# Patient Record
Sex: FEMALE | Race: ASIAN | NOT HISPANIC OR LATINO | Employment: FULL TIME | URBAN - NONMETROPOLITAN AREA
[De-identification: names, ages, dates, MRNs, and addresses within clinical notes are randomized per-mention and may not be internally consistent; named-entity substitution may affect disease eponyms.]

---

## 2021-09-13 ENCOUNTER — OFFICE VISIT (OUTPATIENT)
Dept: FAMILY MEDICINE CLINIC | Facility: CLINIC | Age: 51
End: 2021-09-13

## 2021-09-13 VITALS
SYSTOLIC BLOOD PRESSURE: 104 MMHG | HEART RATE: 79 BPM | BODY MASS INDEX: 28.16 KG/M2 | DIASTOLIC BLOOD PRESSURE: 66 MMHG | OXYGEN SATURATION: 99 % | HEIGHT: 62 IN | WEIGHT: 153 LBS | RESPIRATION RATE: 18 BRPM

## 2021-09-13 DIAGNOSIS — Z76.89 ESTABLISHING CARE WITH NEW DOCTOR, ENCOUNTER FOR: Primary | ICD-10-CM

## 2021-09-13 DIAGNOSIS — Z12.31 ENCOUNTER FOR SCREENING MAMMOGRAM FOR MALIGNANT NEOPLASM OF BREAST: ICD-10-CM

## 2021-09-13 DIAGNOSIS — J12.82 PNEUMONIA DUE TO COVID-19 VIRUS: ICD-10-CM

## 2021-09-13 DIAGNOSIS — K21.9 GASTROESOPHAGEAL REFLUX DISEASE WITHOUT ESOPHAGITIS: ICD-10-CM

## 2021-09-13 DIAGNOSIS — Z12.11 COLON CANCER SCREENING: ICD-10-CM

## 2021-09-13 DIAGNOSIS — U07.1 PNEUMONIA DUE TO COVID-19 VIRUS: ICD-10-CM

## 2021-09-13 DIAGNOSIS — E66.3 OVERWEIGHT (BMI 25.0-29.9): ICD-10-CM

## 2021-09-13 PROCEDURE — 99203 OFFICE O/P NEW LOW 30 MIN: CPT | Performed by: FAMILY MEDICINE

## 2021-09-13 RX ORDER — MELATONIN
1000 DAILY
COMMUNITY

## 2021-09-13 RX ORDER — ESOMEPRAZOLE MAGNESIUM 40 MG/1
CAPSULE, DELAYED RELEASE ORAL
COMMUNITY
Start: 2021-09-03 | End: 2022-11-28

## 2021-09-13 RX ORDER — ACETAMINOPHEN 500 MG
1000 TABLET ORAL
COMMUNITY
Start: 2021-08-28 | End: 2022-11-28

## 2021-09-13 RX ORDER — SUCRALFATE 1 G/1
1 TABLET ORAL 2 TIMES DAILY
COMMUNITY
End: 2022-03-14

## 2021-09-13 NOTE — PROGRESS NOTES
"Subjective cc: est care after COVID   Benitoveronique Jarrell is a 51 y.o. female who presents to est care after recent COVID infection.   She was treated at [facility] for COVID, went to ED via EMS - had IV infusion, DC home, stayed home bt was losing weight so she went back - bilateral PNA - admitted for 7-8 days - was having a lot of chest pain and diarrhea. A lot of GERD - started on carafate and nexium - she would like to stop carafate - GI symptoms are improving - going to finish nexium x 30 days     She is feeling much better. She started driving again on Monday. She is still getting tired very easy, she is tolerating PO, regaining weihgt     No other known medical issues   CS x 2     She did have her COVID vaccine - 8/10/21    Needs mammogram   No prior colonoscopy - no known FH of colon cancer   Would like to est with GYN but doesn't know who yet     History of Present Illness     The following portions of the patient's history were reviewed and updated as appropriate: allergies, current medications, past family history, past medical history, past social history, past surgical history and problem list.        Review of Systems   Constitutional: Positive for fatigue.   Gastrointestinal:        GERD   All other systems reviewed and are negative.      Objective   Blood pressure 104/66, pulse 79, resp. rate 18, height 157.5 cm (62\"), weight 69.4 kg (153 lb), SpO2 99 %.  Physical Exam  Vitals and nursing note reviewed.   Constitutional:       General: She is not in acute distress.     Appearance: She is well-developed. She is not diaphoretic.   HENT:      Head: Normocephalic and atraumatic.      Nose: Nose normal.   Eyes:      General:         Right eye: No discharge.         Left eye: No discharge.      Conjunctiva/sclera: Conjunctivae normal.   Neck:      Thyroid: No thyromegaly.      Trachea: No tracheal deviation.   Cardiovascular:      Rate and Rhythm: Normal rate and regular rhythm.      Heart sounds: Normal " heart sounds.   Pulmonary:      Effort: Pulmonary effort is normal. No respiratory distress.      Breath sounds: Normal breath sounds. No stridor. No wheezing.   Chest:      Chest wall: No tenderness.   Abdominal:      General: Bowel sounds are normal. There is no distension.      Palpations: Abdomen is soft.      Tenderness: There is abdominal tenderness in the epigastric area.   Musculoskeletal:         General: Normal range of motion.      Cervical back: Normal range of motion.   Lymphadenopathy:      Cervical: No cervical adenopathy.   Skin:     General: Skin is warm and dry.   Neurological:      Mental Status: She is alert and oriented to person, place, and time.      Motor: No abnormal muscle tone.      Coordination: Coordination normal.   Psychiatric:         Behavior: Behavior normal.         Thought Content: Thought content normal.         Judgment: Judgment normal.         Assessment/Plan   Problems Addressed this Visit     None      Visit Diagnoses     Establishing care with new doctor, encounter for    -  Primary    Encounter for screening mammogram for malignant neoplasm of breast        Relevant Orders    Mammo Screening Digital Tomosynthesis Bilateral With CAD    Colon cancer screening        Relevant Orders    Cologuard - Stool, Per Rectum    Pneumonia due to COVID-19 virus        Overweight (BMI 25.0-29.9)        Gastroesophageal reflux disease without esophagitis        Relevant Medications    esomeprazole (nexIUM) 40 MG capsule    sucralfate (Carafate) 1 g tablet      Diagnoses       Codes Comments    Establishing care with new doctor, encounter for    -  Primary ICD-10-CM: Z76.89  ICD-9-CM: V65.8     Encounter for screening mammogram for malignant neoplasm of breast     ICD-10-CM: Z12.31  ICD-9-CM: V76.12     Colon cancer screening     ICD-10-CM: Z12.11  ICD-9-CM: V76.51     Pneumonia due to COVID-19 virus     ICD-10-CM: U07.1, J12.82  ICD-9-CM: 480.8, 079.89     Overweight (BMI 25.0-29.9)      ICD-10-CM: E66.3  ICD-9-CM: 278.02     Gastroesophageal reflux disease without esophagitis     ICD-10-CM: K21.9  ICD-9-CM: 530.81         PLAN:     #1 COVI DPNA: new, recovered, will review DC notes     #2 overweihgt: Patient's Body mass index is 27.98 kg/m². indicating that she is overweight (BMI 25-29.9). Obesity-related health conditions include the following: none. Obesity is newly identified. BMI is is above average; BMI management plan is completed. We discussed portion control and increasing exercise..    #3 GERD: new, improving, DC carafate, complete 30 days Nexium - return I f not improving     #4 screening: will review labs, mammo ordered, will call about GYN appt, [place]           This document has been electronically signed by Christine Zhao MD on September 13, 2021 13:56 CDT

## 2021-12-16 DIAGNOSIS — R92.8 ABNORMAL MAMMOGRAM OF RIGHT BREAST: Primary | ICD-10-CM

## 2021-12-20 DIAGNOSIS — N63.0 BREAST NODULE: Primary | ICD-10-CM

## 2021-12-22 ENCOUNTER — TELEPHONE (OUTPATIENT)
Dept: FAMILY MEDICINE CLINIC | Facility: CLINIC | Age: 51
End: 2021-12-22

## 2021-12-22 NOTE — TELEPHONE ENCOUNTER
Patient advised that the results have not been signed off on at [facility] by the radiology. They will fax once they have been completed

## 2021-12-22 NOTE — TELEPHONE ENCOUNTER
Caller: Von Jarrell      Relationship: Self    Best call back number: [phone]    What is the best time to reach you:     Who are you requesting to speak with (clinical staff, provider,  specific staff member): CLINICAL STAFF    Do you know the name of the person who called: PATIENT    What was the call regarding: RESULTS OF MAMMOGRAPHY     Do you require a callback: YES

## 2022-03-14 ENCOUNTER — OFFICE VISIT (OUTPATIENT)
Dept: FAMILY MEDICINE CLINIC | Facility: CLINIC | Age: 52
End: 2022-03-14

## 2022-03-14 VITALS
OXYGEN SATURATION: 100 % | TEMPERATURE: 97.5 F | HEIGHT: 62 IN | BODY MASS INDEX: 28.89 KG/M2 | DIASTOLIC BLOOD PRESSURE: 76 MMHG | WEIGHT: 157 LBS | HEART RATE: 135 BPM | SYSTOLIC BLOOD PRESSURE: 107 MMHG

## 2022-03-14 DIAGNOSIS — H93.12 TINNITUS OF LEFT EAR: Primary | ICD-10-CM

## 2022-03-14 DIAGNOSIS — R92.8 ABNORMAL MAMMOGRAM: ICD-10-CM

## 2022-03-14 PROCEDURE — 99213 OFFICE O/P EST LOW 20 MIN: CPT | Performed by: FAMILY MEDICINE

## 2022-03-14 NOTE — PROGRESS NOTES
" Subjective   Tutut ENZO Jarrell is a 52 y.o. female.     Chief Complaint   Patient presents with   • Establish Care       Cc:  Buzzing left ear       History of Present Illness     Buzzing in left ear.  Missed appointment with cande due to covid, needs another referral.  Also  Had abnormal mammogram and follow up ultrasound.  Her provider asked that she get a second opinion regarding this from a specialist.  She asks who she should see.     She is doing well.    She had tragedy last year, her  .     Pmh:  None  Psh:  csec x 2  Sh:  No tobaco, etoh, pot  Fh:  Non contributory (family live to be quite old)    Review of Systems   Constitutional: Negative for chills, fatigue and fever.   HENT: Positive for tinnitus. Negative for congestion, ear discharge, ear pain, facial swelling, hearing loss, postnasal drip, rhinorrhea, sinus pressure, sore throat, trouble swallowing and voice change.    Eyes: Negative for discharge, redness and visual disturbance.   Respiratory: Negative for cough, chest tightness, shortness of breath and wheezing.    Cardiovascular: Negative for chest pain and palpitations.   Gastrointestinal: Negative for abdominal pain, blood in stool, constipation, diarrhea, nausea and vomiting.   Endocrine: Negative for polydipsia and polyuria.   Genitourinary: Negative for dysuria, flank pain, hematuria and urgency.   Musculoskeletal: Negative for arthralgias, back pain, joint swelling and myalgias.   Skin: Negative for rash.   Neurological: Negative for dizziness, weakness, numbness and headaches.   Hematological: Negative for adenopathy.   Psychiatric/Behavioral: Negative for confusion and sleep disturbance. The patient is not nervous/anxious.            /76 (BP Location: Left arm, Patient Position: Sitting, Cuff Size: Adult)   Pulse (!) 135   Temp 97.5 °F (36.4 °C) (Infrared)   Ht 157.5 cm (62.01\")   Wt 71.2 kg (157 lb)   SpO2 100%   BMI 28.71 kg/m²       Objective     Physical " Exam  Vitals and nursing note reviewed.   Constitutional:       Appearance: Normal appearance. She is well-developed.   HENT:      Head: Normocephalic and atraumatic.      Right Ear: External ear normal.      Left Ear: External ear normal.      Nose: Nose normal. No rhinorrhea.   Eyes:      General: No scleral icterus.     Extraocular Movements: Extraocular movements intact.      Conjunctiva/sclera: Conjunctivae normal.      Pupils: Pupils are equal, round, and reactive to light.   Cardiovascular:      Rate and Rhythm: Normal rate and regular rhythm.      Heart sounds: Normal heart sounds.     No friction rub. No gallop.   Pulmonary:      Effort: Pulmonary effort is normal.      Breath sounds: Normal breath sounds.   Abdominal:      General: Bowel sounds are normal. There is no distension.      Palpations: Abdomen is soft.      Tenderness: There is no abdominal tenderness.   Musculoskeletal:         General: No deformity. Normal range of motion.      Cervical back: Normal range of motion and neck supple.   Skin:     General: Skin is warm and dry.      Findings: No erythema or rash.   Neurological:      Mental Status: She is alert and oriented to person, place, and time.      Cranial Nerves: No cranial nerve deficit.   Psychiatric:         Behavior: Behavior normal.         Thought Content: Thought content normal.         Judgment: Judgment normal.             PAST MEDICAL HISTORY   No past medical history on file.   PAST SURGICAL HISTORY     Past Surgical History:   Procedure Laterality Date   •  SECTION      x 2       SOCIAL HISTORY     Social History     Socioeconomic History   • Marital status:    Tobacco Use   • Smoking status: Never Smoker   • Smokeless tobacco: Never Used   Substance and Sexual Activity   • Alcohol use: Never   • Drug use: Never   • Sexual activity: Defer      ALLERGIES   Patient has no known allergies.   MEDICATIONS     Current Outpatient Medications   Medication Sig Dispense  Refill   • Ascorbic Acid (VITAMIN C PO) Take  by mouth.     • cholecalciferol (VITAMIN D3) 25 MCG (1000 UT) tablet Take 1,000 Units by mouth Daily.     • esomeprazole (nexIUM) 40 MG capsule TAKE 1 CAPSULE BY MOUTH 1x Daily: 9 AM [facility]     • Zinc Sulfate (ZINC-220 PO) Take  by mouth.     • acetaminophen (TYLENOL) 500 MG tablet 1,000 mg.       No current facility-administered medications for this visit.        The following portions of the patient's history were reviewed and updated as appropriate: allergies, current medications, past family history, past medical history, past social history, past surgical history and problem list.        Assessment/Plan   Diagnoses and all orders for this visit:    1. Tinnitus of left ear (Primary)  -     Ambulatory Referral to ENT (Otolaryngology)    2. Abnormal mammogram  -     Ambulatory Referral to General Surgery      Referrals.    Any problems call.                  No follow-ups on file.                  This document has been electronically signed by Ramon Da Silva MD on March 14, 2022 12:57 CDT

## 2022-03-17 ENCOUNTER — PATIENT ROUNDING (BHMG ONLY) (OUTPATIENT)
Dept: FAMILY MEDICINE CLINIC | Facility: CLINIC | Age: 52
End: 2022-03-17

## 2022-03-17 NOTE — PROGRESS NOTES
March 17, 2022    Hello, may I speak with Von Jarrell?    My name is Radha Rascon      I am  with [facility] - [place]  [address]  [facility] [address] [phone].    Before we get started may I verify your date of birth? 1970    I am calling to officially welcome you to our practice and ask about your recent visit. Is this a good time to talk? yes    Tell me about your visit with us. What things went well?  Everything went well/ Dr Da Silva is a very good Dr.       We're always looking for ways to make our patients' experiences even better. Do you have recommendations on ways we may improve?  no    Overall were you satisfied with your first visit to our practice? yes       I appreciate you taking the time to speak with me today. Is there anything else I can do for you? no      Thank you, and have a great day.

## 2022-03-23 ENCOUNTER — OFFICE VISIT (OUTPATIENT)
Dept: SURGERY | Facility: CLINIC | Age: 52
End: 2022-03-23

## 2022-03-23 VITALS
HEIGHT: 62 IN | DIASTOLIC BLOOD PRESSURE: 72 MMHG | WEIGHT: 157 LBS | BODY MASS INDEX: 28.89 KG/M2 | SYSTOLIC BLOOD PRESSURE: 104 MMHG | OXYGEN SATURATION: 99 % | HEART RATE: 71 BPM

## 2022-03-23 DIAGNOSIS — R92.8 ABNORMAL MAMMOGRAM: Primary | ICD-10-CM

## 2022-03-23 PROCEDURE — 99203 OFFICE O/P NEW LOW 30 MIN: CPT | Performed by: SURGERY

## 2022-03-23 NOTE — PROGRESS NOTES
Chief Complaint   Patient presents with   • Abnormal Breast Imaging        HPI  52 year old with an abnormal screening mammogram. No newly palpable masses, skin dimpling, nipple discharge or axillary adenopathy.    Imaging:    Birads 3-I have personally reviewed the imaging and concur with the radiologist's findings.    History reviewed. No pertinent past medical history.    Past Surgical History:   Procedure Laterality Date   •  SECTION      x 2          Current Outpatient Medications:   •  Ascorbic Acid (VITAMIN C PO), Take  by mouth., Disp: , Rfl:   •  cholecalciferol (VITAMIN D3) 25 MCG (1000 UT) tablet, Take 1,000 Units by mouth Daily., Disp: , Rfl:   •  Zinc Sulfate (ZINC-220 PO), Take  by mouth., Disp: , Rfl:   •  acetaminophen (TYLENOL) 500 MG tablet, 1,000 mg., Disp: , Rfl:   •  esomeprazole (nexIUM) 40 MG capsule, TAKE 1 CAPSULE BY MOUTH 1x Daily: 9 AM [facility], Disp: , Rfl:     No Known Allergies    Family History   Problem Relation Age of Onset   • Heart attack Mother    • Stroke Father    • Diabetes Brother    • Cancer Daughter        Social History     Socioeconomic History   • Marital status:    Tobacco Use   • Smoking status: Never Smoker   • Smokeless tobacco: Never Used   Substance and Sexual Activity   • Alcohol use: Never   • Drug use: Never   • Sexual activity: Defer       Review of Systems   Constitutional: Negative.    HENT: Negative.    Eyes: Negative.    Respiratory: Negative.    Cardiovascular: Negative.    Gastrointestinal: Negative.    Endocrine: Negative.    Genitourinary: Negative.    Musculoskeletal: Negative.    Skin: Negative.    Allergic/Immunologic: Negative.    Neurological: Negative.    Hematological: Negative.    Psychiatric/Behavioral: Negative.        Physical Exam  Vitals reviewed.   Constitutional:       Appearance: Normal appearance.   Cardiovascular:      Rate and Rhythm: Normal rate and regular rhythm.   Chest:   Breasts: Breasts are symmetrical.       Right: No inverted nipple, mass, nipple discharge, skin change or tenderness.      Left: No inverted nipple, mass, nipple discharge, skin change or tenderness.       Musculoskeletal:      Cervical back: Normal range of motion and neck supple.   Neurological:      Mental Status: She is alert.           ASSESSMENT    Diagnoses and all orders for this visit:    1. Abnormal mammogram (Primary)        PLAN    1. Recheck in 6 months with mammogram and exam              This document has been electronically signed by Jose Sky MD on March 23, 2022 22:30 CDT

## 2022-05-02 ENCOUNTER — TELEPHONE (OUTPATIENT)
Dept: FAMILY MEDICINE CLINIC | Facility: CLINIC | Age: 52
End: 2022-05-02

## 2022-05-02 NOTE — TELEPHONE ENCOUNTER
Called regarding overdue Cologuard kit. No answer, No voicemail. Pt does not seem to have  as her PCP anymore. Called to also confirm this. Letter sent to patient.

## 2022-05-19 ENCOUNTER — OFFICE VISIT (OUTPATIENT)
Dept: FAMILY MEDICINE CLINIC | Facility: CLINIC | Age: 52
End: 2022-05-19

## 2022-05-19 DIAGNOSIS — R05.9 COUGH: ICD-10-CM

## 2022-05-19 DIAGNOSIS — J30.2 SEASONAL ALLERGIC RHINITIS, UNSPECIFIED TRIGGER: Primary | ICD-10-CM

## 2022-05-19 PROCEDURE — 99213 OFFICE O/P EST LOW 20 MIN: CPT | Performed by: FAMILY MEDICINE

## 2022-05-19 RX ORDER — PREDNISONE 10 MG/1
30 TABLET ORAL DAILY
Qty: 15 TABLET | Refills: 1 | Status: SHIPPED | OUTPATIENT
Start: 2022-05-19 | End: 2022-06-02

## 2022-05-19 RX ORDER — AZITHROMYCIN 250 MG/1
TABLET, FILM COATED ORAL
Qty: 6 TABLET | Refills: 0 | Status: SHIPPED | OUTPATIENT
Start: 2022-05-19 | End: 2022-06-02

## 2022-05-19 NOTE — PROGRESS NOTES
Subjective   Von Jarrell is a 52 y.o. female.     Chief Complaint   Patient presents with   • Allergies           Phone visit  History of Present Illness     Allergies/sinus/cough.  Took dexamethasone and helped while taking it.  She is taking claritin one daily.  She would like a zpack       Review of Systems   HENT: Positive for congestion, postnasal drip and rhinorrhea.    Respiratory: Positive for cough.            There were no vitals taken for this visit.      Objective     Physical Exam  Vitals reviewed: phone visit.             PAST MEDICAL HISTORY   No past medical history on file.   PAST SURGICAL HISTORY     Past Surgical History:   Procedure Laterality Date   •  SECTION      x 2       SOCIAL HISTORY     Social History     Socioeconomic History   • Marital status:    Tobacco Use   • Smoking status: Never Smoker   • Smokeless tobacco: Never Used   Substance and Sexual Activity   • Alcohol use: Never   • Drug use: Never   • Sexual activity: Defer      ALLERGIES   Patient has no known allergies.   MEDICATIONS     Current Outpatient Medications   Medication Sig Dispense Refill   • acetaminophen (TYLENOL) 500 MG tablet 1,000 mg.     • Ascorbic Acid (VITAMIN C PO) Take  by mouth.     • cholecalciferol (VITAMIN D3) 25 MCG (1000 UT) tablet Take 1,000 Units by mouth Daily.     • esomeprazole (nexIUM) 40 MG capsule TAKE 1 CAPSULE BY MOUTH 1x Daily: 9 AM [facility]     • Zinc Sulfate (ZINC-220 PO) Take  by mouth.     • azithromycin (Zithromax) 250 MG tablet Take 2 tablets the first day, then 1 tablet daily for 4 days. 6 tablet 0   • predniSONE (DELTASONE) 10 MG tablet Take 3 tablets by mouth Daily. 15 tablet 1     No current facility-administered medications for this visit.        The following portions of the patient's history were reviewed and updated as appropriate: allergies, current medications, past family history, past medical history, past social history, past surgical history and  problem list.        Assessment & Plan   Diagnoses and all orders for this visit:    1. Seasonal allergic rhinitis, unspecified trigger (Primary)    2. Cough    Other orders  -     azithromycin (Zithromax) 250 MG tablet; Take 2 tablets the first day, then 1 tablet daily for 4 days.  Dispense: 6 tablet; Refill: 0  -     predniSONE (DELTASONE) 10 MG tablet; Take 3 tablets by mouth Daily.  Dispense: 15 tablet; Refill: 1        Take 2 claritin daily or at bedtime if makes her drowsy    This visit has been rescheduled as a phone visit to comply with patient safety concerns in accordance with CDC recommendations. Total time of discussion was 7 minutes.                   No follow-ups on file.                  This document has been electronically signed by Ramon Da Silva MD on May 19, 2022 12:54 CDT

## 2022-06-02 ENCOUNTER — CLINICAL SUPPORT (OUTPATIENT)
Dept: AUDIOLOGY | Facility: CLINIC | Age: 52
End: 2022-06-02

## 2022-06-02 ENCOUNTER — OFFICE VISIT (OUTPATIENT)
Dept: OTOLARYNGOLOGY | Facility: CLINIC | Age: 52
End: 2022-06-02

## 2022-06-02 VITALS — BODY MASS INDEX: 29.44 KG/M2 | HEIGHT: 62 IN | TEMPERATURE: 97 F | WEIGHT: 160 LBS

## 2022-06-02 DIAGNOSIS — H90.42 SENSORINEURAL HEARING LOSS (SNHL) OF LEFT EAR WITH UNRESTRICTED HEARING OF RIGHT EAR: Primary | ICD-10-CM

## 2022-06-02 DIAGNOSIS — H93.12 TINNITUS, LEFT: Primary | ICD-10-CM

## 2022-06-02 DIAGNOSIS — H90.42 SENSORINEURAL HEARING LOSS (SNHL) OF LEFT EAR WITH UNRESTRICTED HEARING OF RIGHT EAR: ICD-10-CM

## 2022-06-02 DIAGNOSIS — H93.12 TINNITUS OF LEFT EAR: ICD-10-CM

## 2022-06-02 PROCEDURE — 99203 OFFICE O/P NEW LOW 30 MIN: CPT | Performed by: OTOLARYNGOLOGY

## 2022-06-02 PROCEDURE — 92557 COMPREHENSIVE HEARING TEST: CPT | Performed by: AUDIOLOGIST

## 2022-06-02 PROCEDURE — 92567 TYMPANOMETRY: CPT | Performed by: AUDIOLOGIST

## 2022-06-02 NOTE — PROGRESS NOTES
Subjective   Von Jarrell is a 52 y.o. female.       History of Present Illness   Patient reports that approximately 3 years ago she and her  traveled to [place] and [place], making multiple takeoffs and a sense by airline.  Reports that on descent back into [place] she experienced abrupt onset of ringing in the left ear.  This has not gone away since then and is there on a daily basis.  Is more notable in a quiet environment.  She does not feel like her hearing is significantly decreased.      The following portions of the patient's history were reviewed and updated as appropriate: allergies, current medications, past family history, past medical history, past social history, past surgical history and problem list.     reports that she has never smoked. She has never used smokeless tobacco. She reports that she does not drink alcohol and does not use drugs.   Patient is not a tobacco user and has not been counseled for use of tobacco products      Review of Systems        Objective   Physical Exam  Ears: External ears no deformity, canals no discharge, tympanic membranes intact clear and mobile bilaterally.  Nares: No discharge purulence  Oral cavity: No masses or lesions  Pharynx: No erythema or exudate  Neck: No adenopathy or mass    Audiogram is obtained and reviewed.  Hearing is within normal limits in all frequencies on the right with a type a tympanogram.  She has a high-frequency sensorineural hearing loss starting at 2000 Hz on the left.  Tympanograms are type a bilaterally.  Discrimination scores are 100% bilaterally.         Assessment and Plan   Diagnoses and all orders for this visit:    1. Tinnitus, left (Primary)    2. Sensorineural hearing loss (SNHL) of left ear with unrestricted hearing of right ear             Plan: Her history is consistent with barotrauma induced hearing loss and the tinnitus is likely a result of the hearing loss.  I explained this to her including the fact  that there is no proven medical remedy for tinnitus.  She should use masking noise as needed.  Even though her hearing is significantly asymmetric from 2000 through 8000 Hz, with an obvious etiology and 100% discrimination I do not think an MRI scan is necessary but I do want to see her again in 6 months with another hearing test to assure stability.  She is to call sooner for problems

## 2022-06-03 NOTE — PROGRESS NOTES
STANDARD AUDIOMETRIC EVALUATION      Name:  Von Jarrell  :  1970  Age:  52 y.o.  Date of Evaluation:  6/3/2022      HISTORY    Reason for visit:  Von Jarrell is seen today for a hearing test at the request of Dr. Kelvin Ly.  Patient reports she has been hearing buzzing in her left ear for the past 3 years. She states it started after she was on several airplane trips.  She states her hearing seems fine.       EVALUATION    See Audiogram    RESULTS        Otoscopy and Tympanometry 226 Hz :  Right Ear:  Otoscopy:  Testing completed after ears were examined by the ENT physician          Tympanometry:  Middle ear function within normal limits    Left Ear:   Otoscopy:  Testing completed after ears were examined by the ENT physician        Tympanometry:  Middle ear function within normal limits    Test technique:  Standard Audiometry     Pure Tone Audiometry:   Patient responded to pure tones at 5-10 dB for 250-8000 Hz in right ear, and at 5-65 dB for 250-8000 Hz in left ear.       Speech Audiometry:        Right Ear:  Speech Reception Threshold (SRT) was obtained at 0 dBHL                 Speech Discrimination scores were 100% in quiet when words were presented at 40 dBHL       Left Ear:  Speech Reception Threshold (SRT) was obtained at 10 dBHL                 Speech Discrimination scores were 100% in quiet when words were presented at 50 dBHL    Reliability:   good    IMPRESSIONS:  1.  Tympanometry results are consistent with Middle ear function within normal limits in both ears.  2.  Pure tone results are consistent with hearing sensitivity within normal limits for right ear, and within normal limits to moderate sloping sensorineural hearing loss in left ear.       RECOMMENDATIONS:  Patient is seeing the Ear Nose and Throat physician immediately following this examination.  It was a pleasure seeing Von Jarrell in Audiology today.  We would be happy to do further testing or discuss these test  as necessary.          This document has been electronically signed by Fern Morris MS CCC-ENZO on Mimi 3, 2022 12:49 CDT       Fern Morris MS CCC-A  Licensed Audiologist

## 2022-08-24 ENCOUNTER — TELEPHONE (OUTPATIENT)
Dept: FAMILY MEDICINE CLINIC | Facility: CLINIC | Age: 52
End: 2022-08-24

## 2022-08-24 NOTE — TELEPHONE ENCOUNTER
----- Message from Christine Zhao MD sent at 2022 11:51 PM CDT -----  Regarding: RE: Cologuard  Yes  ----- Message -----  From: Staci Tinoco  Sent: 2022   2:54 PM CDT  To: Christine Zhao MD  Subject: Cologuard                                        Her Cologuard order will  next month. I have been unable to reach the patient. She has a new PCP. Ok to cancel?

## 2022-11-28 ENCOUNTER — OFFICE VISIT (OUTPATIENT)
Dept: FAMILY MEDICINE CLINIC | Facility: CLINIC | Age: 52
End: 2022-11-28

## 2022-11-28 VITALS
OXYGEN SATURATION: 98 % | WEIGHT: 159 LBS | DIASTOLIC BLOOD PRESSURE: 81 MMHG | SYSTOLIC BLOOD PRESSURE: 108 MMHG | HEART RATE: 75 BPM | BODY MASS INDEX: 29.26 KG/M2 | HEIGHT: 62 IN | TEMPERATURE: 97.5 F

## 2022-11-28 DIAGNOSIS — R92.8 ABNORMAL MAMMOGRAM: ICD-10-CM

## 2022-11-28 DIAGNOSIS — R06.2 WHEEZE: Primary | ICD-10-CM

## 2022-11-28 PROCEDURE — 99213 OFFICE O/P EST LOW 20 MIN: CPT | Performed by: FAMILY MEDICINE

## 2022-11-28 RX ORDER — ALBUTEROL SULFATE 2.5 MG/3ML
2.5 SOLUTION RESPIRATORY (INHALATION) EVERY 4 HOURS PRN
Qty: 360 ML | Refills: 11 | Status: SHIPPED | OUTPATIENT
Start: 2022-11-28

## 2022-11-28 RX ORDER — ALBUTEROL SULFATE 90 UG/1
2 AEROSOL, METERED RESPIRATORY (INHALATION) EVERY 4 HOURS PRN
Qty: 18 G | Refills: 11 | Status: SHIPPED | OUTPATIENT
Start: 2022-11-28

## 2022-11-28 NOTE — PROGRESS NOTES
" Subjective   Von Jarrell is a 52 y.o. female.     Chief Complaint   Patient presents with   • Wheezing             History of Present Illness     Ever since covid, now wheezing.  Sob with activity.   No  History of asthma, non smoker.   Also  History of abnormal mammogram.  Was to have repeat mammogram and see dr nguyen in September.   Also  She was given a cologuard test, she has not done this yet.   She is staying busy.  Working at a bank.      Review of Systems   Constitutional: Negative for chills, fatigue and fever.   HENT: Negative for congestion, ear discharge, ear pain, facial swelling, hearing loss, postnasal drip, rhinorrhea, sinus pressure, sore throat, trouble swallowing and voice change.    Eyes: Negative for discharge, redness and visual disturbance.   Respiratory: Positive for wheezing. Negative for cough, chest tightness and shortness of breath.    Cardiovascular: Negative for chest pain and palpitations.   Gastrointestinal: Negative for abdominal pain, blood in stool, constipation, diarrhea, nausea and vomiting.   Endocrine: Negative for polydipsia and polyuria.   Genitourinary: Negative for dysuria, flank pain, hematuria and urgency.   Musculoskeletal: Negative for arthralgias, back pain, joint swelling and myalgias.   Skin: Negative for rash.   Neurological: Negative for dizziness, weakness, numbness and headaches.   Hematological: Negative for adenopathy.   Psychiatric/Behavioral: Negative for confusion and sleep disturbance. The patient is not nervous/anxious.            /81 (BP Location: Left arm, Patient Position: Sitting, Cuff Size: Adult)   Pulse 75   Temp 97.5 °F (36.4 °C) (Infrared)   Ht 157.5 cm (62.01\")   Wt 72.1 kg (159 lb)   SpO2 98%   BMI 29.07 kg/m²       Objective     Physical Exam  Vitals and nursing note reviewed.   Constitutional:       Appearance: Normal appearance. She is well-developed.   HENT:      Head: Normocephalic and atraumatic.      Right Ear: External ear " normal.      Left Ear: External ear normal.      Nose: Nose normal. No rhinorrhea.   Eyes:      General: No scleral icterus.     Extraocular Movements: Extraocular movements intact.      Conjunctiva/sclera: Conjunctivae normal.      Pupils: Pupils are equal, round, and reactive to light.   Cardiovascular:      Rate and Rhythm: Normal rate and regular rhythm.      Heart sounds: Normal heart sounds.     No friction rub. No gallop.   Pulmonary:      Effort: Pulmonary effort is normal.      Breath sounds: Wheezing present.   Abdominal:      General: Bowel sounds are normal. There is no distension.      Palpations: Abdomen is soft.      Tenderness: There is no abdominal tenderness.   Musculoskeletal:         General: No deformity. Normal range of motion.      Cervical back: Normal range of motion and neck supple.   Skin:     General: Skin is warm and dry.      Findings: No erythema or rash.   Neurological:      Mental Status: She is alert and oriented to person, place, and time.      Cranial Nerves: No cranial nerve deficit.   Psychiatric:         Behavior: Behavior normal.         Thought Content: Thought content normal.         Judgment: Judgment normal.             PAST MEDICAL HISTORY   No past medical history on file.   PAST SURGICAL HISTORY     Past Surgical History:   Procedure Laterality Date   •  SECTION      x 2       SOCIAL HISTORY     Social History     Socioeconomic History   • Marital status:    Tobacco Use   • Smoking status: Never   • Smokeless tobacco: Never   Substance and Sexual Activity   • Alcohol use: Never   • Drug use: Never   • Sexual activity: Defer      ALLERGIES   Patient has no known allergies.   MEDICATIONS     Current Outpatient Medications   Medication Sig Dispense Refill   • Ascorbic Acid (VITAMIN C PO) Take  by mouth.     • cholecalciferol (VITAMIN D3) 25 MCG (1000 UT) tablet Take 1,000 Units by mouth Daily.     • Zinc Sulfate (ZINC-220 PO) Take  by mouth.     • albuterol  (PROVENTIL) (2.5 MG/3ML) 0.083% nebulizer solution Take 2.5 mg by nebulization Every 4 (Four) Hours As Needed for Wheezing or Shortness of Air. 360 mL 11     No current facility-administered medications for this visit.        The following portions of the patient's history were reviewed and updated as appropriate: allergies, current medications, past family history, past medical history, past social history, past surgical history and problem list.        Assessment & Plan   Diagnoses and all orders for this visit:    1. Wheeze (Primary)  -     XR Chest PA & Lateral (In Office)  -     albuterol (PROVENTIL) (2.5 MG/3ML) 0.083% nebulizer solution; Take 2.5 mg by nebulization Every 4 (Four) Hours As Needed for Wheezing or Shortness of Air.  Dispense: 360 mL; Refill: 11    2. Abnormal mammogram  -     Mammo Diagnostic Bilateral With CAD  -     Ambulatory Referral to General Surgery        She has neb machine at home.  Use this prn wheeze.   I will call with xray is complete.     Mammogram and to see dr nguyen.     She is to perform cologuard test    She reports having had blood work last year    Her brother has diabetes, I want to make sure in future test her for diabetes.                No follow-ups on file.                  This document has been electronically signed by Ramon Da Silva MD on November 28, 2022 12:31 CST     Answers for HPI/ROS submitted by the patient on 11/28/2022  Please describe your symptoms.: Wheezing, out of breath after short activity such as house clean, walk the dog.  Have you had these symptoms before?: Yes  How long have you been having these symptoms?: Greater than 2 weeks  Please list any medications you are currently taking for this condition.: Mucinex  OT, Delsym OT  Please describe any probable cause for these symptoms. : Not sure.  What is the primary reason for your visit?: Other

## 2022-12-01 ENCOUNTER — OFFICE VISIT (OUTPATIENT)
Dept: OTOLARYNGOLOGY | Facility: CLINIC | Age: 52
End: 2022-12-01

## 2022-12-01 ENCOUNTER — CLINICAL SUPPORT (OUTPATIENT)
Dept: AUDIOLOGY | Facility: CLINIC | Age: 52
End: 2022-12-01

## 2022-12-01 VITALS — WEIGHT: 159.2 LBS | HEIGHT: 62 IN | BODY MASS INDEX: 29.3 KG/M2 | TEMPERATURE: 97 F

## 2022-12-01 DIAGNOSIS — H90.42 SENSORINEURAL HEARING LOSS (SNHL) OF LEFT EAR WITH UNRESTRICTED HEARING OF RIGHT EAR: Primary | ICD-10-CM

## 2022-12-01 DIAGNOSIS — H93.12 TINNITUS, LEFT: ICD-10-CM

## 2022-12-01 DIAGNOSIS — H93.12 TINNITUS OF LEFT EAR: ICD-10-CM

## 2022-12-01 PROCEDURE — 92567 TYMPANOMETRY: CPT | Performed by: AUDIOLOGIST

## 2022-12-01 PROCEDURE — 92557 COMPREHENSIVE HEARING TEST: CPT | Performed by: AUDIOLOGIST

## 2022-12-01 PROCEDURE — 99213 OFFICE O/P EST LOW 20 MIN: CPT | Performed by: OTOLARYNGOLOGY

## 2022-12-01 NOTE — PROGRESS NOTES
STANDARD AUDIOMETRIC EVALUATION      Name:  Von Jarrell  :  1970  Age:  52 y.o.  Date of Evaluation:  2022      HISTORY    Reason for visit:  Von Jarrell is seen today for a 6 month follow up hearing test at the request of Dr. Kelvin Ly.  Patient presented 6 months ago with a left ear high frequency sensorineural hearing loss after flying in an airplane.  Today, she reports no change in her hearing.  She states she still hears a constant buzzing sound in her left ear.      EVALUATION    See Audiogram    RESULTS        Otoscopy and Tympanometry 226 Hz :  Right Ear:  Otoscopy:  Clear ear canal          Tympanometry:  Middle ear function within normal limits    Left Ear:   Otoscopy:  Clear ear canal        Tympanometry:  Middle ear function within normal limits    Test technique:  Standard Audiometry     Pure Tone Audiometry:   Patient responded to pure tones at 5-15 dB for 250-8000 Hz in right ear, and at 10-60 dB for 250-8000 Hz in left ear.       Speech Audiometry:        Right Ear:  Speech Reception Threshold (SRT) was obtained at 5 dBHL                 Speech Discrimination scores were 96% in quiet when words were presented at 45 dBHL       Left Ear:  Speech Reception Threshold (SRT) was obtained at 5 dBHL                 Speech Discrimination scores were 96% in quiet when words were presented at 45 dBHL    Reliability:   good    IMPRESSIONS:  1.  Tympanometry results are consistent with Middle ear function within normal limits in both ears.  2.  Pure tone results are consistent with hearing sensitivity within normal limits for right ear, and within normal limits to moderate sloping sensorineural hearing loss in left ear.       RECOMMENDATIONS:  Patient is seeing the Ear Nose and Throat physician immediately following this examination.  It was a pleasure seeing Von Jarrell in Audiology today.  We would be happy to do further testing or discuss these test as necessary.          This  document has been electronically signed by Fern Morris MS CCC-A on December 1, 2022 14:47 CST       Fern Morris MS CCC-A  Licensed Audiologist

## 2022-12-02 NOTE — PROGRESS NOTES
Vaughn Jarrell is a 52 y.o. female.       History of Present Illness   Patient was seen previously with a left-sided high-frequency sensorineural hearing loss thought to be due to barotrauma following plane travel.  Has tinnitus in that ear.  Returns today for reevaluation and states that her symptoms are unchanged.  She does use headphones at work and that seems to help.  No otorrhea.      The following portions of the patient's history were reviewed and updated as appropriate: allergies, current medications, past family history, past medical history, past social history, past surgical history and problem list.     reports that she has never smoked. She has never used smokeless tobacco. She reports that she does not drink alcohol and does not use drugs.   Patient is not a tobacco user and has not been counseled for use of tobacco products      Review of Systems        Objective   Physical Exam  Ears: External ears no deformity, canals no discharge, tympanic membranes intact clear and mobile bilaterally.    Audiogram is obtained and reviewed.  This again shows normal hearing on the right in all frequencies and a sloping sensorineural hearing loss beginning at 2000 Hz on the left.  Tympanograms are type a bilaterally.  Discrimination scores are 96% bilaterally.         Assessment and Plan   Diagnoses and all orders for this visit:    1. Sensorineural hearing loss (SNHL) of left ear with unrestricted hearing of right ear (Primary)    2. Tinnitus, left             Plan: Reassured the patient that her hearing was stable.  Continue masking as needed for the tinnitus.  Return in 1 year with another hearing test call sooner for problems

## 2022-12-28 ENCOUNTER — OFFICE VISIT (OUTPATIENT)
Dept: SURGERY | Facility: CLINIC | Age: 52
End: 2022-12-28
Payer: MEDICAID

## 2022-12-28 VITALS
WEIGHT: 159.4 LBS | OXYGEN SATURATION: 98 % | HEIGHT: 62 IN | HEART RATE: 66 BPM | DIASTOLIC BLOOD PRESSURE: 74 MMHG | BODY MASS INDEX: 29.33 KG/M2 | SYSTOLIC BLOOD PRESSURE: 118 MMHG

## 2022-12-28 DIAGNOSIS — R92.8 ABNORMAL MAMMOGRAM OF RIGHT BREAST: Primary | ICD-10-CM

## 2022-12-28 PROCEDURE — 1160F RVW MEDS BY RX/DR IN RCRD: CPT | Performed by: SURGERY

## 2022-12-28 PROCEDURE — 99213 OFFICE O/P EST LOW 20 MIN: CPT | Performed by: SURGERY

## 2022-12-28 PROCEDURE — 1159F MED LIST DOCD IN RCRD: CPT | Performed by: SURGERY

## 2023-01-01 ENCOUNTER — DOCUMENTATION (OUTPATIENT)
Dept: SURGERY | Facility: CLINIC | Age: 53
End: 2023-01-01
Payer: MEDICAID

## 2023-01-01 PROBLEM — R92.8 ABNORMAL MAMMOGRAM OF RIGHT BREAST: Status: ACTIVE | Noted: 2023-01-01

## 2023-01-01 RX ORDER — OXYCODONE HYDROCHLORIDE AND ACETAMINOPHEN 5; 325 MG/1; MG/1
1 TABLET ORAL ONCE
Status: CANCELLED | OUTPATIENT
Start: 2023-01-01 | End: 2023-01-01

## 2023-01-01 RX ORDER — LIDOCAINE HYDROCHLORIDE AND EPINEPHRINE 10; 10 MG/ML; UG/ML
30 INJECTION, SOLUTION INFILTRATION; PERINEURAL ONCE
Status: CANCELLED | OUTPATIENT
Start: 2023-01-01 | End: 2023-01-01

## 2023-01-01 RX ORDER — DIAZEPAM 5 MG/1
5 TABLET ORAL ONCE
Status: CANCELLED | OUTPATIENT
Start: 2023-01-01 | End: 2023-01-01

## 2023-01-01 NOTE — H&P (VIEW-ONLY)
Chief Complaint   Patient presents with   • Follow-up     6 month follow up Bilateral Diagnostic Mammogram        HPI  52 year woman- no newly palpable masses, skin dimpling, nipple discharge, or axillary adenopathy. Imaging:    Study Result    Narrative & Impression      PROCEDURE: Bilateral diagnostic mammogram with 3-D tomosynthesis  with CAD and ultrasound right breast     REASON FOR EXAM: Abnormal screening mammography     FINDINGS: Comparison study dated 12/9/2021. Heterogeneously dense  fibroglandular tissue which decreases the sensitivity of this  exam. Right breast 9:00 position small mammographic nodule seen.  This prompted ultrasound of this lesion. On ultrasound, this  lesion is seen in the 8-9:00 position 5 cm the nipple. This  lesion corresponds to a solid hypoechoic nodule with slightly  irregular borders which measures 0.85 x 0.53 x 0.75 cm. This  lesion is suspicious and therefore warrants biopsy to further  evaluate. Ultrasound of the right breast also reveals 12:00  position 3 cm from nipple posterior hypoechoic solid nodule with  irregular borders which measures 0.88 x 0.53 x 1.44 cm. This  lesion was measured twice on ultrasound. This lesion is also  suspicious and warrants biopsy to further evaluate. Ultrasound  right breast also reveals 2:00 position 2 cm from nipple small  oval hypoechoic lesion with smooth well-defined borders and  echogenicity consistent with adjacent normal fat lobules. This  therefore most likely represents normal fat lobule in this  region. However, recommend six-month follow-up right unilateral  mammogram/ultrasound to document stability. Ultrasound of the  right breast is otherwise unremarkable. Right breast upper inner  quadrant calcifications were further characterized with spot mag  imaging. These appear dystrophic and benign. No malignant  features. However, recommend six month follow-up right unilateral  mammogram to document stability. Otherwise no interval  change in  appearance of the mammogram.     Parenchymal pattern: Heterogeneously dense fibroglandular tissue     IMPRESSION:  1.  Right breast 8-9:00 position 5 cm from nipple small mass  lesion which would be suspicious for possible malignancy.  Recommend biopsy to further evaluate.  2.  Additional right breast 12:00 position 3 cm from nipple  posterior hypoechoic solid nodule described above which would be  suspicious for possible malignancy. Recommend biopsy to further  evaluate.  3.  Ultrasound right breast also reveals 2:00 position 2 cm from  nipple small oval hypoechoic lesion with smooth well-defined  borders and echogenicity consistent with adjacent normal fat  lobules. This therefore most likely represents normal fat lobule  in this region. However, recommend six-month follow-up right  unilateral mammogram/ultrasound to document stability.   4.  Right breast upper inner quadrant calcifications were further  characterized with spot mag imaging. These appear dystrophic and  benign. No malignant features. However, recommend six month  follow-up right unilateral mammogram to document stability.      BI-RADS category 4: Suspicious abnormality     Recommendation: Ultrasound  biopsy.      Electronically signed by:  Raffi Stoll MD  2022 10:55 AM CST  Workstation: [id number]      I have personally reviewed the imaging and concur with the radiologist's findings.    No past medical history on file.    Past Surgical History:   Procedure Laterality Date   •  SECTION      x 2          Current Outpatient Medications:   •  Ascorbic Acid (VITAMIN C PO), Take  by mouth., Disp: , Rfl:   •  cholecalciferol (VITAMIN D3) 25 MCG (1000 UT) tablet, Take 1,000 Units by mouth Daily., Disp: , Rfl:   •  Zinc Sulfate (ZINC-220 PO), Take  by mouth., Disp: , Rfl:   •  albuterol (PROVENTIL) (2.5 MG/3ML) 0.083% nebulizer solution, Take 2.5 mg by nebulization Every 4 (Four) Hours As Needed for Wheezing or Shortness of Air.,  Disp: 360 mL, Rfl: 11  •  albuterol sulfate  (90 Base) MCG/ACT inhaler, Inhale 2 puffs Every 4 (Four) Hours As Needed for Wheezing. ANY ALBUTEROL INHALER IS FINE, ANY THAT HER INSURANCE COVERS. THANKS., Disp: 18 g, Rfl: 11    No Known Allergies    Family History   Problem Relation Age of Onset   • Heart attack Mother    • Stroke Father    • Diabetes Brother    • Cancer Daughter        Social History     Socioeconomic History   • Marital status:    Tobacco Use   • Smoking status: Never   • Smokeless tobacco: Never   Substance and Sexual Activity   • Alcohol use: Never   • Drug use: Never   • Sexual activity: Defer       Review of Systems   Constitutional: Negative for appetite change, chills, fever and unexpected weight change.   HENT: Negative for hearing loss, nosebleeds and trouble swallowing.    Eyes: Negative for visual disturbance.   Respiratory: Negative for apnea, cough, choking, chest tightness, shortness of breath, wheezing and stridor.    Cardiovascular: Negative for chest pain, palpitations and leg swelling.   Gastrointestinal: Negative for abdominal distention, abdominal pain, blood in stool, constipation, diarrhea, nausea and vomiting.   Endocrine: Negative for cold intolerance, heat intolerance, polydipsia, polyphagia and polyuria.   Genitourinary: Negative for difficulty urinating, dysuria, frequency, hematuria and urgency.   Musculoskeletal: Negative for arthralgias, back pain, myalgias and neck pain.   Skin: Negative for color change, pallor and rash.   Allergic/Immunologic: Negative for immunocompromised state.   Neurological: Negative for dizziness, seizures, syncope, light-headedness, numbness and headaches.   Hematological: Negative for adenopathy.   Psychiatric/Behavioral: Negative for suicidal ideas. The patient is not nervous/anxious.        Physical Exam  Chest:   Breasts:     Breasts are symmetrical.      Right: No inverted nipple, mass, nipple discharge, skin change or  tenderness.      Left: No inverted nipple, mass, nipple discharge, skin change or tenderness.           ASSESSMENT    Diagnoses and all orders for this visit:    1. Abnormal mammogram of right breast (Primary)- 2 areas        PLAN    1. US mammotome right breast 2 areas    The following were discussed with the patient/family:      What are the indications that have led your doctor to the opinion that an operation is necessary?    Breast imaging has determined an abnormal area requiring biopsy.    What, if any, alternative treatments are available for your condition?    Alternatively, open biopsy in the operating room may be performed under sedation or general anesthesia. Observation is not a good option.    What will be the likely result if you don't have the operation?    There is a possibility of missing a breast cancer diagnosis.    What are the basic procedures involved in the operation?    The patient will be placed supine for the procedure. A small incision will be made under local anesthesia, and a special, large needle will be used to perform the biopsy.   A permanent titanium clip will be placed in the breast to [place] the site of biopsy should further surgery be necessary.    What are the risks?    The risks of bleeding, infection, the possible need for open biopsy or other procedure, scarring, and poor wound healing are explained. Bruising almost always occurs. There is a low transfusion risk. The risks of chronic pain are, likewise, low.     How is the operation expected to improve your health or quality of life?    The lesion can usually be determined to be benign or malignant. Follow up xrays or further open excision may be necessary depending on the pathology.    Is hospitalization necessary and, if so, how long can you expect to be hospitalized?    This procedure is performed on an outpatient basis.    What can you expect during your recovery period?    Minimal pain is usually controlled with  non-narcotic analgesia.    When can you expect to resume normal activities?    Normal activity may be resumed the following day.    Are there likely to be residual effects from the operation?    Usually, there are no residual effects following biiopsy.    .   All questions were answered. The patient agrees to operation.              This document has been electronically signed by Jose Sky MD on January 1, 2023 14:05 CST

## 2023-01-01 NOTE — PROGRESS NOTES
Chief Complaint   Patient presents with   • Follow-up     6 month follow up Bilateral Diagnostic Mammogram        HPI  52 year woman- no newly palpable masses, skin dimpling, nipple discharge, or axillary adenopathy. Imaging:    Study Result    Narrative & Impression      PROCEDURE: Bilateral diagnostic mammogram with 3-D tomosynthesis  with CAD and ultrasound right breast     REASON FOR EXAM: Abnormal screening mammography     FINDINGS: Comparison study dated 12/9/2021. Heterogeneously dense  fibroglandular tissue which decreases the sensitivity of this  exam. Right breast 9:00 position small mammographic nodule seen.  This prompted ultrasound of this lesion. On ultrasound, this  lesion is seen in the 8-9:00 position 5 cm the nipple. This  lesion corresponds to a solid hypoechoic nodule with slightly  irregular borders which measures 0.85 x 0.53 x 0.75 cm. This  lesion is suspicious and therefore warrants biopsy to further  evaluate. Ultrasound of the right breast also reveals 12:00  position 3 cm from nipple posterior hypoechoic solid nodule with  irregular borders which measures 0.88 x 0.53 x 1.44 cm. This  lesion was measured twice on ultrasound. This lesion is also  suspicious and warrants biopsy to further evaluate. Ultrasound  right breast also reveals 2:00 position 2 cm from nipple small  oval hypoechoic lesion with smooth well-defined borders and  echogenicity consistent with adjacent normal fat lobules. This  therefore most likely represents normal fat lobule in this  region. However, recommend six-month follow-up right unilateral  mammogram/ultrasound to document stability. Ultrasound of the  right breast is otherwise unremarkable. Right breast upper inner  quadrant calcifications were further characterized with spot mag  imaging. These appear dystrophic and benign. No malignant  features. However, recommend six month follow-up right unilateral  mammogram to document stability. Otherwise no interval  change in  appearance of the mammogram.     Parenchymal pattern: Heterogeneously dense fibroglandular tissue     IMPRESSION:  1.  Right breast 8-9:00 position 5 cm from nipple small mass  lesion which would be suspicious for possible malignancy.  Recommend biopsy to further evaluate.  2.  Additional right breast 12:00 position 3 cm from nipple  posterior hypoechoic solid nodule described above which would be  suspicious for possible malignancy. Recommend biopsy to further  evaluate.  3.  Ultrasound right breast also reveals 2:00 position 2 cm from  nipple small oval hypoechoic lesion with smooth well-defined  borders and echogenicity consistent with adjacent normal fat  lobules. This therefore most likely represents normal fat lobule  in this region. However, recommend six-month follow-up right  unilateral mammogram/ultrasound to document stability.   4.  Right breast upper inner quadrant calcifications were further  characterized with spot mag imaging. These appear dystrophic and  benign. No malignant features. However, recommend six month  follow-up right unilateral mammogram to document stability.      BI-RADS category 4: Suspicious abnormality     Recommendation: Ultrasound  biopsy.      Electronically signed by:  Raffi Stoll MD  2022 10:55 AM CST  Workstation: [id number]      I have personally reviewed the imaging and concur with the radiologist's findings.    No past medical history on file.    Past Surgical History:   Procedure Laterality Date   •  SECTION      x 2          Current Outpatient Medications:   •  Ascorbic Acid (VITAMIN C PO), Take  by mouth., Disp: , Rfl:   •  cholecalciferol (VITAMIN D3) 25 MCG (1000 UT) tablet, Take 1,000 Units by mouth Daily., Disp: , Rfl:   •  Zinc Sulfate (ZINC-220 PO), Take  by mouth., Disp: , Rfl:   •  albuterol (PROVENTIL) (2.5 MG/3ML) 0.083% nebulizer solution, Take 2.5 mg by nebulization Every 4 (Four) Hours As Needed for Wheezing or Shortness of Air.,  Disp: 360 mL, Rfl: 11  •  albuterol sulfate  (90 Base) MCG/ACT inhaler, Inhale 2 puffs Every 4 (Four) Hours As Needed for Wheezing. ANY ALBUTEROL INHALER IS FINE, ANY THAT HER INSURANCE COVERS. THANKS., Disp: 18 g, Rfl: 11    No Known Allergies    Family History   Problem Relation Age of Onset   • Heart attack Mother    • Stroke Father    • Diabetes Brother    • Cancer Daughter        Social History     Socioeconomic History   • Marital status:    Tobacco Use   • Smoking status: Never   • Smokeless tobacco: Never   Substance and Sexual Activity   • Alcohol use: Never   • Drug use: Never   • Sexual activity: Defer       Review of Systems   Constitutional: Negative for appetite change, chills, fever and unexpected weight change.   HENT: Negative for hearing loss, nosebleeds and trouble swallowing.    Eyes: Negative for visual disturbance.   Respiratory: Negative for apnea, cough, choking, chest tightness, shortness of breath, wheezing and stridor.    Cardiovascular: Negative for chest pain, palpitations and leg swelling.   Gastrointestinal: Negative for abdominal distention, abdominal pain, blood in stool, constipation, diarrhea, nausea and vomiting.   Endocrine: Negative for cold intolerance, heat intolerance, polydipsia, polyphagia and polyuria.   Genitourinary: Negative for difficulty urinating, dysuria, frequency, hematuria and urgency.   Musculoskeletal: Negative for arthralgias, back pain, myalgias and neck pain.   Skin: Negative for color change, pallor and rash.   Allergic/Immunologic: Negative for immunocompromised state.   Neurological: Negative for dizziness, seizures, syncope, light-headedness, numbness and headaches.   Hematological: Negative for adenopathy.   Psychiatric/Behavioral: Negative for suicidal ideas. The patient is not nervous/anxious.        Physical Exam  Chest:   Breasts:     Breasts are symmetrical.      Right: No inverted nipple, mass, nipple discharge, skin change or  tenderness.      Left: No inverted nipple, mass, nipple discharge, skin change or tenderness.           ASSESSMENT    Diagnoses and all orders for this visit:    1. Abnormal mammogram of right breast (Primary)- 2 areas        PLAN    1. US mammotome right breast 2 areas    The following were discussed with the patient/family:      What are the indications that have led your doctor to the opinion that an operation is necessary?    Breast imaging has determined an abnormal area requiring biopsy.    What, if any, alternative treatments are available for your condition?    Alternatively, open biopsy in the operating room may be performed under sedation or general anesthesia. Observation is not a good option.    What will be the likely result if you don't have the operation?    There is a possibility of missing a breast cancer diagnosis.    What are the basic procedures involved in the operation?    The patient will be placed supine for the procedure. A small incision will be made under local anesthesia, and a special, large needle will be used to perform the biopsy.   A permanent titanium clip will be placed in the breast to [place] the site of biopsy should further surgery be necessary.    What are the risks?    The risks of bleeding, infection, the possible need for open biopsy or other procedure, scarring, and poor wound healing are explained. Bruising almost always occurs. There is a low transfusion risk. The risks of chronic pain are, likewise, low.     How is the operation expected to improve your health or quality of life?    The lesion can usually be determined to be benign or malignant. Follow up xrays or further open excision may be necessary depending on the pathology.    Is hospitalization necessary and, if so, how long can you expect to be hospitalized?    This procedure is performed on an outpatient basis.    What can you expect during your recovery period?    Minimal pain is usually controlled with  non-narcotic analgesia.    When can you expect to resume normal activities?    Normal activity may be resumed the following day.    Are there likely to be residual effects from the operation?    Usually, there are no residual effects following biiopsy.    .   All questions were answered. The patient agrees to operation.              This document has been electronically signed by Jose Sky MD on January 1, 2023 14:05 CST

## 2023-01-04 ENCOUNTER — HOSPITAL ENCOUNTER (OUTPATIENT)
Dept: ULTRASOUND IMAGING | Facility: HOSPITAL | Age: 53
Discharge: HOME | End: 2023-01-04
Payer: COMMERCIAL

## 2023-01-04 VITALS
HEIGHT: 62 IN | WEIGHT: 158.07 LBS | DIASTOLIC BLOOD PRESSURE: 79 MMHG | SYSTOLIC BLOOD PRESSURE: 116 MMHG | BODY MASS INDEX: 29.09 KG/M2 | HEART RATE: 88 BPM | OXYGEN SATURATION: 98 % | RESPIRATION RATE: 18 BRPM | TEMPERATURE: 97.6 F

## 2023-01-04 DIAGNOSIS — R92.8 ABNORMAL MAMMOGRAM OF RIGHT BREAST: ICD-10-CM

## 2023-01-04 PROCEDURE — A4648 IMPLANTABLE TISSUE MARKER: HCPCS

## 2023-01-04 PROCEDURE — 19083 BX BREAST 1ST LESION US IMAG: CPT | Performed by: SURGERY

## 2023-01-04 PROCEDURE — 19084 BX BREAST ADD LESION US IMAG: CPT | Performed by: SURGERY

## 2023-01-04 PROCEDURE — 88305 TISSUE EXAM BY PATHOLOGIST: CPT

## 2023-01-04 RX ORDER — LIDOCAINE HYDROCHLORIDE AND EPINEPHRINE 10; 10 MG/ML; UG/ML
30 INJECTION, SOLUTION INFILTRATION; PERINEURAL ONCE
Status: COMPLETED | OUTPATIENT
Start: 2023-01-04 | End: 2023-01-04

## 2023-01-04 RX ORDER — DIAZEPAM 5 MG/1
5 TABLET ORAL ONCE
Status: DISCONTINUED | OUTPATIENT
Start: 2023-01-04 | End: 2023-01-05 | Stop reason: HOSPADM

## 2023-01-04 RX ORDER — OXYCODONE HYDROCHLORIDE AND ACETAMINOPHEN 5; 325 MG/1; MG/1
1 TABLET ORAL ONCE
Status: DISCONTINUED | OUTPATIENT
Start: 2023-01-04 | End: 2023-01-05 | Stop reason: HOSPADM

## 2023-01-04 RX ADMIN — LIDOCAINE HYDROCHLORIDE AND EPINEPHRINE 35 ML: 10; 10 INJECTION, SOLUTION INFILTRATION; PERINEURAL at 09:06

## 2023-01-04 NOTE — INTERVAL H&P NOTE
H&P reviewed. The patient was examined and there are no changes to the H&P.      Temp:  [97.9 °F (36.6 °C)] 97.9 °F (36.6 °C)  Heart Rate:  [72] 72  Resp:  [18] 18  BP: (116)/(82) 116/82    The report of the mammogram demonstrates 2 areas of abnormality that are confirmed with ultrasound.  We will proceed with ultrasound biopsy of both areas under mammotome guidance with clip placement.    The following were discussed with the patient/family:      What are the indications that have led your doctor to the opinion that an operation is necessary?    Breast imaging has determined an abnormal area requiring biopsy.    What, if any, alternative treatments are available for your condition?    Alternatively, open biopsy in the operating room may be performed under sedation or general anesthesia. Observation is not a good option.    What will be the likely result if you don't have the operation?    There is a possibility of missing a breast cancer diagnosis.    What are the basic procedures involved in the operation?    The patient will be placed supine for the procedure. A small incision will be made under local anesthesia, and a special, large needle will be used to perform the biopsy.   A permanent titanium clip will be placed in the breast to [place] the site of biopsy should further surgery be necessary.    What are the risks?    The risks of bleeding, infection, the possible need for open biopsy or other procedure, scarring, and poor wound healing are explained. Bruising almost always occurs. There is a low transfusion risk. The risks of chronic pain are, likewise, low.     How is the operation expected to improve your health or quality of life?    The lesion can usually be determined to be benign or malignant. Follow up xrays or further open excision may be necessary depending on the pathology.    Is hospitalization necessary and, if so, how long can you expect to be hospitalized?    This procedure is performed on an  outpatient basis.    What can you expect during your recovery period?    Minimal pain is usually controlled with non-narcotic analgesia.    When can you expect to resume normal activities?    Normal activity may be resumed the following day.    Are there likely to be residual effects from the operation?    Usually, there are no residual effects following biiopsy.    .   All questions were answered. The patient agrees to operation.

## 2023-01-05 LAB — REF LAB TEST METHOD: NORMAL

## 2023-01-10 ENCOUNTER — OFFICE VISIT (OUTPATIENT)
Dept: SURGERY | Facility: CLINIC | Age: 53
End: 2023-01-10
Payer: COMMERCIAL

## 2023-01-10 VITALS
HEIGHT: 62 IN | OXYGEN SATURATION: 100 % | TEMPERATURE: 97.2 F | DIASTOLIC BLOOD PRESSURE: 70 MMHG | SYSTOLIC BLOOD PRESSURE: 110 MMHG | WEIGHT: 159 LBS | BODY MASS INDEX: 29.26 KG/M2 | HEART RATE: 66 BPM

## 2023-01-10 DIAGNOSIS — R92.8 ABNORMAL MAMMOGRAM OF RIGHT BREAST: Primary | ICD-10-CM

## 2023-01-10 PROCEDURE — 99212 OFFICE O/P EST SF 10 MIN: CPT | Performed by: SURGERY

## 2023-01-11 NOTE — PROGRESS NOTES
Chief Complaint   Patient presents with   • Follow-up     Mammotome results        HPI  52-year-old woman status post ultrasound mammotome demonstrating the following:    DIAGNOSIS:  A. BREAST, BIOPSY, NEEDLE CORES, RIGHT 8-9:00:  Fibroadenoma.  Negative for atypia and malignancy.  B. BREAST, BIOPSY, NEEDLE CORES, RIGHT 12:00:  Fibroadenoma, adenosis, and luminal calcifications.  Negative for atypia and malignancy.    No past medical history on file.    Past Surgical History:   Procedure Laterality Date   •  SECTION      x 2          Current Outpatient Medications:   •  albuterol (PROVENTIL) (2.5 MG/3ML) 0.083% nebulizer solution, Take 2.5 mg by nebulization Every 4 (Four) Hours As Needed for Wheezing or Shortness of Air., Disp: 360 mL, Rfl: 11  •  albuterol sulfate  (90 Base) MCG/ACT inhaler, Inhale 2 puffs Every 4 (Four) Hours As Needed for Wheezing. ANY ALBUTEROL INHALER IS FINE, ANY THAT HER INSURANCE COVERS. THANKS., Disp: 18 g, Rfl: 11  •  Ascorbic Acid (VITAMIN C PO), Take  by mouth., Disp: , Rfl:   •  cholecalciferol (VITAMIN D3) 25 MCG (1000 UT) tablet, Take 1,000 Units by mouth Daily., Disp: , Rfl:   •  Zinc Sulfate (ZINC-220 PO), Take  by mouth., Disp: , Rfl:     No Known Allergies    Family History   Problem Relation Age of Onset   • Heart attack Mother    • Stroke Father    • Diabetes Brother    • Cancer Daughter        Social History     Socioeconomic History   • Marital status:    Tobacco Use   • Smoking status: Never   • Smokeless tobacco: Never   Vaping Use   • Vaping Use: Never used   Substance and Sexual Activity   • Alcohol use: Never   • Drug use: Never   • Sexual activity: Defer           Physical Exam  Incisions are nicely healing without evidence of infection, drainage or significant hematoma    ASSESSMENT    Diagnoses and all orders for this visit:    1. Abnormal mammogram of right breast (Primary)  -     Mammo diagnostic digital tomosynthesis right w CAD; Future  -      US Breast Right Limited; Future        PLAN    1.  Recheck in 6 weeks with mammogram and ultrasound on right side              This document has been electronically signed by Jose Sky MD on January 11, 2023 15:59 CST

## 2023-04-25 ENCOUNTER — OFFICE VISIT (OUTPATIENT)
Dept: FAMILY MEDICINE CLINIC | Facility: CLINIC | Age: 53
End: 2023-04-25
Payer: COMMERCIAL

## 2023-04-25 DIAGNOSIS — J30.2 SEASONAL ALLERGIC RHINITIS, UNSPECIFIED TRIGGER: ICD-10-CM

## 2023-04-25 DIAGNOSIS — R06.2 WHEEZING: Primary | ICD-10-CM

## 2023-04-25 RX ORDER — PREDNISONE 20 MG/1
TABLET ORAL
Qty: 10 TABLET | Refills: 2 | Status: SHIPPED | OUTPATIENT
Start: 2023-04-25

## 2023-04-25 RX ORDER — ALBUTEROL SULFATE 90 UG/1
2 AEROSOL, METERED RESPIRATORY (INHALATION) EVERY 4 HOURS PRN
Qty: 18 G | Refills: 11 | Status: SHIPPED | OUTPATIENT
Start: 2023-04-25

## 2023-04-25 RX ORDER — LORATADINE 10 MG/1
10 TABLET ORAL DAILY
Qty: 30 TABLET | Refills: 11 | Status: SHIPPED | OUTPATIENT
Start: 2023-04-25

## 2023-04-25 NOTE — PROGRESS NOTES
Subjective   Tutut ENZO Jarrell is a 53 y.o. female.     Chief Complaint   Patient presents with   • Med Refill           PHONE VISIT    History of Present Illness     HAS not had to use inhaler much until yesterday.  Now is wheezing all of the time.  Working at the [employer].  Doesn't think she needs time off or has an infection.  Never diagnosed with asthma but will have episodes of wheezing ever since having covid.   She is taking mucinex dm and it is helping her cough    Review of Systems   Respiratory: Positive for cough and wheezing.            There were no vitals taken for this visit.      Objective     Physical Exam  Vitals reviewed: phone visit.             PAST MEDICAL HISTORY   No past medical history on file.   PAST SURGICAL HISTORY     Past Surgical History:   Procedure Laterality Date   •  SECTION      x 2       SOCIAL HISTORY     Social History     Socioeconomic History   • Marital status:    Tobacco Use   • Smoking status: Never     Passive exposure: Never   • Smokeless tobacco: Never   Vaping Use   • Vaping Use: Never used   Substance and Sexual Activity   • Alcohol use: Never   • Drug use: Never   • Sexual activity: Defer      ALLERGIES   Patient has no known allergies.   MEDICATIONS     Current Outpatient Medications   Medication Sig Dispense Refill   • albuterol (PROVENTIL) (2.5 MG/3ML) 0.083% nebulizer solution Take 2.5 mg by nebulization Every 4 (Four) Hours As Needed for Wheezing or Shortness of Air. 360 mL 11   • Ascorbic Acid (VITAMIN C PO) Take  by mouth.     • cholecalciferol (VITAMIN D3) 25 MCG (1000 UT) tablet Take 1 tablet by mouth Daily.     • Zinc Sulfate (ZINC-220 PO) Take  by mouth.     • albuterol sulfate  (90 Base) MCG/ACT inhaler Inhale 2 puffs Every 4 (Four) Hours As Needed for Wheezing. ANY ALBUTEROL INHALER IS FINE, ANY THAT HER INSURANCE COVERS. THANKS. 18 g 11   • loratadine (Claritin) 10 MG tablet Take 1 tablet by mouth Daily. 30 tablet 11   • predniSONE  (DELTASONE) 20 MG tablet 40MG QAM FOR 5 DAYS.  REFILLS ARE FOR YOU TO TREAT YOURSELF TO AVOID EXACERBATIONS. 10 tablet 2     No current facility-administered medications for this visit.        The following portions of the patient's history were reviewed and updated as appropriate: allergies, current medications, past family history, past medical history, past social history, past surgical history and problem list.        Assessment & Plan   Diagnoses and all orders for this visit:    1. Wheezing (Primary)    2. Seasonal allergic rhinitis, unspecified trigger    Other orders  -     albuterol sulfate  (90 Base) MCG/ACT inhaler; Inhale 2 puffs Every 4 (Four) Hours As Needed for Wheezing. ANY ALBUTEROL INHALER IS FINE, ANY THAT HER INSURANCE COVERS. THANKS.  Dispense: 18 g; Refill: 11  -     loratadine (Claritin) 10 MG tablet; Take 1 tablet by mouth Daily.  Dispense: 30 tablet; Refill: 11  -     predniSONE (DELTASONE) 20 MG tablet; 40MG QAM FOR 5 DAYS.  REFILLS ARE FOR YOU TO TREAT YOURSELF TO AVOID EXACERBATIONS.  Dispense: 10 tablet; Refill: 2      If continues, will send to pulmonologist.  I suspect asthma.      Went over meds.    Recommended she take claritin daily for 30 days at least.       This visit has been rescheduled as a phone visit to comply with patient safety concerns in accordance with CDC recommendations. Total time of discussion was 7 minutes.                   No follow-ups on file.                  This document has been electronically signed by Ramon Da Silva MD on April 25, 2023 16:47 CDT

## 2023-05-03 ENCOUNTER — TELEPHONE (OUTPATIENT)
Dept: FAMILY MEDICINE CLINIC | Facility: CLINIC | Age: 53
End: 2023-05-03
Payer: COMMERCIAL

## 2023-05-03 DIAGNOSIS — J40 BRONCHITIS: ICD-10-CM

## 2023-05-03 DIAGNOSIS — R05.1 ACUTE COUGH: Primary | ICD-10-CM

## 2023-05-03 RX ORDER — AZITHROMYCIN 250 MG/1
TABLET, FILM COATED ORAL
Qty: 6 TABLET | Refills: 0 | Status: SHIPPED | OUTPATIENT
Start: 2023-05-03

## 2023-05-03 RX ORDER — BENZONATATE 100 MG/1
100 CAPSULE ORAL 3 TIMES DAILY PRN
Qty: 60 CAPSULE | Refills: 0 | Status: SHIPPED | OUTPATIENT
Start: 2023-05-03

## 2023-05-03 NOTE — TELEPHONE ENCOUNTER
PATIENT HAD A PHONE VISIT LAST WEEK WITH DR. BONE.  SHE SAID HE OFFERED TO SEND IN ANTIBIOTICS AND SOMETHING FOR HER COUGH.  SHE DECLINED, BUT CALLED TODAY ASKING IF THOSE COULD BE SENT TO [facility] [place].

## 2023-05-24 ENCOUNTER — TELEPHONE (OUTPATIENT)
Dept: FAMILY MEDICINE CLINIC | Facility: CLINIC | Age: 53
End: 2023-05-24
Payer: COMMERCIAL

## 2023-05-24 DIAGNOSIS — R06.2 WHEEZING: Primary | ICD-10-CM

## 2023-05-24 NOTE — TELEPHONE ENCOUNTER
MsDion Chrystal called and wanted to know if you could send a referral to Pulmonologist, she said she is having a lot of mucus, and that you had prescribed her an inhaler?

## 2023-05-25 RX ORDER — BUDESONIDE AND FORMOTEROL FUMARATE DIHYDRATE 80; 4.5 UG/1; UG/1
2 AEROSOL RESPIRATORY (INHALATION)
Qty: 10.2 G | Refills: 12 | Status: SHIPPED | OUTPATIENT
Start: 2023-05-25

## 2023-05-25 NOTE — TELEPHONE ENCOUNTER
Patient aware referral put in.  Mrs. Jarrell also wanted to know if you thought a maintenance inhaler would help her?

## 2023-05-26 DIAGNOSIS — J45.909 ASTHMA, UNSPECIFIED ASTHMA SEVERITY, UNSPECIFIED WHETHER COMPLICATED, UNSPECIFIED WHETHER PERSISTENT: Primary | ICD-10-CM

## 2023-06-05 ENCOUNTER — TELEPHONE (OUTPATIENT)
Dept: FAMILY MEDICINE CLINIC | Facility: CLINIC | Age: 53
End: 2023-06-05
Payer: COMMERCIAL

## 2023-06-05 NOTE — TELEPHONE ENCOUNTER
LIANE--PATIENT WANTED YOU TO KNOW THAT SYMBICORT IS WORKING WELL FOR HER AND SHE SEES A PULMONOLOGIST ON [address].

## 2023-06-15 ENCOUNTER — PROCEDURE VISIT (OUTPATIENT)
Dept: PULMONOLOGY | Facility: CLINIC | Age: 53
End: 2023-06-15
Payer: COMMERCIAL

## 2023-06-15 ENCOUNTER — OFFICE VISIT (OUTPATIENT)
Dept: PULMONOLOGY | Facility: CLINIC | Age: 53
End: 2023-06-15
Payer: COMMERCIAL

## 2023-06-15 VITALS
HEART RATE: 68 BPM | DIASTOLIC BLOOD PRESSURE: 68 MMHG | SYSTOLIC BLOOD PRESSURE: 100 MMHG | OXYGEN SATURATION: 100 % | WEIGHT: 155 LBS | HEIGHT: 62 IN | BODY MASS INDEX: 28.52 KG/M2

## 2023-06-15 DIAGNOSIS — J45.20 MILD INTERMITTENT ASTHMA WITHOUT COMPLICATION: ICD-10-CM

## 2023-06-15 DIAGNOSIS — J45.909 ASTHMA, UNSPECIFIED ASTHMA SEVERITY, UNSPECIFIED WHETHER COMPLICATED, UNSPECIFIED WHETHER PERSISTENT: ICD-10-CM

## 2023-06-15 DIAGNOSIS — R06.2 WHEEZING: Primary | ICD-10-CM

## 2023-06-15 RX ORDER — MONTELUKAST SODIUM 10 MG/1
10 TABLET ORAL NIGHTLY
Qty: 30 TABLET | Refills: 11 | Status: SHIPPED | OUTPATIENT
Start: 2023-06-15

## 2023-06-15 NOTE — PROGRESS NOTES
BASIC SPIROMETRY ONLY.     GOOD PATIENT EFFORT AND COOPERATION.     6 SECOND EXHALATION ON SPIROMETRY.    ORDERED BY LUI HODGES; READ BY DR. REBOLLAR

## 2023-06-15 NOTE — PROGRESS NOTES
Pulmonary Office Visit    Ramon Da Silva MD    Thank you for asking me to see Von Jarrell for   Chief Complaint   Patient presents with    Wheezing       History of Present Illness  Ms. Jarrell is a 52 year old patient referred from PCP for intermittent wheezing. She has had an albuterol inhaler since 11/2022 that she has been using daily since.   Recommended Claritin. She was given symbicort a few weeks ago and this has been working well for her. She doesn't have history of asthma that she knows of. She says she had wheezed off and on since Covid in 2021. She was hospitalized for covid and had the infusion.   Chart reviews show bi-annual treatments for allergic rhinitis likely related to season changes.     She has a neb machine at home, she has used in the AM in the past.   She no longer coughs in the day or at night.     No family hx of asthma, copd, or lung cancer.       Triggers/Pets/Occupation: Lived in [facility], [facility], [place], [facility], Lived here for 26 years. No pets in the house. No fans, no carpet. No identifiable triggers.   Prior medication use: albuterol, symbicort.   Rescue inhaler use: none now.   ER visits/hospitalization/exacerbations at PCP/[facility]: none    Tobacco use history:  Social History     Socioeconomic History    Marital status:    Tobacco Use    Smoking status: Never     Passive exposure: Never    Smokeless tobacco: Never   Vaping Use    Vaping Use: Never used   Substance and Sexual Activity    Alcohol use: Never    Drug use: Never    Sexual activity: Defer         Review of Systems: History obtained from chart review and the patient.  Review of Systems    As described in the HPI. Otherwise, remainder of ROS (14 systems) were negative.    Patient Active Problem List   Diagnosis    Abnormal mammogram of right breast    Wheezing    Mild intermittent asthma         Current Outpatient Medications:     albuterol (PROVENTIL) (2.5 MG/3ML) 0.083% nebulizer solution, Take 2.5 mg by  "nebulization Every 4 (Four) Hours As Needed for Wheezing or Shortness of Air., Disp: 360 mL, Rfl: 11    albuterol sulfate  (90 Base) MCG/ACT inhaler, Inhale 2 puffs Every 4 (Four) Hours As Needed for Wheezing. ANY ALBUTEROL INHALER IS FINE, ANY THAT HER INSURANCE COVERS. THANKS., Disp: 18 g, Rfl: 11    Ascorbic Acid (VITAMIN C PO), Take  by mouth., Disp: , Rfl:     budesonide-formoterol (Symbicort) 80-4.5 MCG/ACT inhaler, Inhale 2 puffs 2 (Two) Times a Day., Disp: 10.2 g, Rfl: 12    cholecalciferol (VITAMIN D3) 25 MCG (1000 UT) tablet, Take 1 tablet by mouth Daily., Disp: , Rfl:     Zinc Sulfate (ZINC-220 PO), Take  by mouth., Disp: , Rfl:     montelukast (SINGULAIR) 10 MG tablet, Take 1 tablet by mouth Every Night., Disp: 30 tablet, Rfl: 11    No Known Allergies    History reviewed. No pertinent past medical history.  Past Surgical History:   Procedure Laterality Date     SECTION      x 2      Social History     Socioeconomic History    Marital status:    Tobacco Use    Smoking status: Never     Passive exposure: Never    Smokeless tobacco: Never   Vaping Use    Vaping Use: Never used   Substance and Sexual Activity    Alcohol use: Never    Drug use: Never    Sexual activity: Defer     Family History   Problem Relation Age of Onset    Heart attack Mother     Stroke Father     Diabetes Brother     Cancer Daughter        Advance Care Planning   ACP discussion was declined by the patient. Patient does not have an advance directive, declines further assistance.          Objective     /68   Pulse 68   Ht 157.5 cm (62\")   Wt 70.3 kg (155 lb)   SpO2 100%   BMI 28.35 kg/m²       BMI is >= 25 and <30. (Overweight) The following options were offered after discussion;: exercise counseling/recommendations      Physical Exam  Cardiovascular:      Rate and Rhythm: Normal rate and regular rhythm.      Heart sounds: Normal heart sounds.   Pulmonary:      Effort: Pulmonary effort is normal.      " Breath sounds: Normal breath sounds.   Neurological:      Mental Status: She is alert and oriented to person, place, and time.   Psychiatric:         Mood and Affect: Mood normal.         Behavior: Behavior normal.         Thought Content: Thought content normal.         Judgment: Judgment normal.       PFTs          PFTs: Done on: 6/15/23 (independently reviewed by myself, interpreted by physician)  Ratio 84  FVC 88  FEV1 92    Normal PFT        Radiology (independently reviewed by me, interpreted by physcian)    No radiology results for the last 30 days.         Assessment       Discussion/ Recommendations:    Diagnosis Plan   1. Mild intermittent asthma uncomplicated Normal PFT without evidence of obstruction while on symbicort.   Could be mild intermittent asthma or allergies.     So far, Symbicort 80mcg has controlled her symptoms.   Can continue this and PRN albuterol due to this not being the first episode of cough/wheezing. She has had a few treatments and acute visits a year for similar symptoms.     Will start Singulair 10mg nightly. Side effects discussed.           Follow up: 6 months for asthma.     I spent 35 minutes caring for Tutut on this date of service. This time includes time spent by me in the following activities: preparing for the visit, reviewing tests, obtaining and/or reviewing a separately obtained history, performing a medically appropriate examination and/or evaluation, counseling and educating the patient/family/caregiver, ordering medications, tests, or procedures, referring and communicating with other health care professionals, documenting information in the medical record, independently interpreting results and communicating that information with the patient/family/caregiver, and care coordination            This document has been electronically signed by LUI Traylor on Mimi 15, 2023 09:24 CDT

## 2024-01-15 NOTE — PROGRESS NOTES
Subjective    Ms. Jarrell is 53 y.o. female    Chief Complaint: left ureteral stone    History of Present Illness    73-year-old female new patient referred for new finding of 5 mm left distal ureteral stone after patient presented to [facility] due to severe left flank pain and blood in urine.  Denies any prior history of kidney stones.  Reports a history in her daughter last year.  Reports is no longer requiring Toradol usage.  Has remained on Flomax.  States the pain is no longer as severe however seems to have shifted lower and is more along the left lower quadrant suprapubic region occasionally radiating to the lower back.  KUB today stone not visualized.    The following portions of the patient's history were reviewed and updated as appropriate: allergies, current medications, past family history, past medical history, past social history, past surgical history and problem list.    Review of Systems   Constitutional:  Negative for chills and fever.   Gastrointestinal:  Negative for abdominal pain, anal bleeding, blood in stool, nausea and vomiting.   Genitourinary:  Positive for flank pain, hematuria and pelvic pain. Negative for decreased urine volume, difficulty urinating, dysuria, frequency and urgency.         Current Outpatient Medications:     albuterol (PROVENTIL) (2.5 MG/3ML) 0.083% nebulizer solution, Take 2.5 mg by nebulization Every 4 (Four) Hours As Needed for Wheezing or Shortness of Air., Disp: 360 mL, Rfl: 11    albuterol sulfate  (90 Base) MCG/ACT inhaler, Inhale 2 puffs Every 4 (Four) Hours As Needed for Wheezing. ANY ALBUTEROL INHALER IS FINE, ANY THAT HER INSURANCE COVERS. THANKS., Disp: 18 g, Rfl: 11    budesonide-formoterol (Symbicort) 80-4.5 MCG/ACT inhaler, Inhale 2 puffs 2 (Two) Times a Day., Disp: 10.2 g, Rfl: 12    montelukast (SINGULAIR) 10 MG tablet, Take 1 tablet by mouth Every Night., Disp: 30 tablet, Rfl: 11    tamsulosin (FLOMAX) 0.4 MG capsule 24 hr  "capsule, Take 1 capsule by mouth Daily., Disp: , Rfl:     Ascorbic Acid (VITAMIN C PO), Take  by mouth. (Patient not taking: Reported on 2024), Disp: , Rfl:     cholecalciferol (VITAMIN D3) 25 MCG (1000 UT) tablet, Take 1 tablet by mouth Daily. (Patient not taking: Reported on 2024), Disp: , Rfl:     Zinc Sulfate (ZINC-220 PO), Take  by mouth. (Patient not taking: Reported on 2024), Disp: , Rfl:     History reviewed. No pertinent past medical history.    Past Surgical History:   Procedure Laterality Date     SECTION      x 2        Social History     Socioeconomic History    Marital status:    Tobacco Use    Smoking status: Never     Passive exposure: Never    Smokeless tobacco: Never   Vaping Use    Vaping Use: Never used   Substance and Sexual Activity    Alcohol use: Never    Drug use: Never    Sexual activity: Defer       Family History   Problem Relation Age of Onset    Heart attack Mother     Stroke Father     Diabetes Brother     Cancer Daughter        Objective    Temp 97.5 °F (36.4 °C)   Ht 157.5 cm (62.01\")   Wt 71.7 kg (158 lb)   BMI 28.89 kg/m²     Physical Exam  Nursing note reviewed.   Constitutional:       General: She is not in acute distress.     Appearance: Normal appearance. She is not ill-appearing.   HENT:      Nose: No congestion.   Abdominal:      Tenderness: There is abdominal tenderness in the suprapubic area and left lower quadrant. There is no right CVA tenderness or left CVA tenderness.      Hernia: No hernia is present.   Skin:     General: Skin is warm and dry.   Neurological:      Mental Status: She is alert and oriented to person, place, and time.   Psychiatric:         Mood and Affect: Mood normal.         Behavior: Behavior normal.             Results for orders placed or performed in visit on 24   POC Urinalysis Dipstick, Multipro    Specimen: Urine   Result Value Ref Range    Color Yellow Yellow, Straw, Dark Yellow, Munira    Clarity, UA Clear " Clear    Glucose, UA Negative Negative mg/dL    Bilirubin Negative Negative    Ketones, UA Negative Negative    Specific Gravity  1.030 1.005 - 1.030    Blood, UA Trace (A) Negative    pH, Urine 6.5 5.0 - 8.0    Protein, POC Negative Negative mg/dL    Urobilinogen, UA 0.2 E.U./dL Normal, 0.2 E.U./dL    Nitrite, UA Negative Negative    Leukocytes Negative Negative   KUB independent review    A KUB is available for me to review today.  The image is inspected for a bowel gas pattern and the general bone structure of the spine and pelvis. The kidneys are then inspected closely.  Renal outline is noted if identifiable. The kidney, collecting system, and anticipated path of the ureter are examined for calcifications including those in the true pelvis.  This film reveals:    On the right there are no calcificaitons seen in the kidney or the expected course of the ureter. .    On the left there are no calcificaitons seen in the kidney or the expected course of the ureter.   Independent review of a CT scan of abdomen and pelvis without contrast  The CT scan of the abdomen/pelvis done without contrast is available for me to review.  Treatment recommendations require an independent review.  First I scanned the liver, spleen, and bowel pattern.  The retroperitoneum including the major vessels and lymphatic packages are briefly reviewed.  This film has been reviewed by the radiologist to determine any nonurologic abnormalities that are present.  The kidneys are closely inspected for size, symmetry, contour, parenchymal thickness, perinephric reaction, presence of calcifications, and intrarenal dilation of the collecting system.  The ureters are inspected for their course, caliber, and any calcifications.  The bladder is inspected for its thickness, size, and presence of any calcifications.  This scan shows 5 mm left distal ureteral obstructing stone causing mild hydronephrosis.  No other stones seen bilaterally.    Assessment  and Plan    Diagnoses and all orders for this visit:    1. Kidney stone (Primary)  -     POC Urinalysis Dipstick, Multipro  -     XR Abdomen KUB; Future  -     CT Abdomen Pelvis Without Contrast; Future      Continues to experience intermittent left lower quadrant and low back pain however reports is not as severe as previous.    KUB no stone visualized today    For now we will have patient continue the previously prescribed tamsulosin and have patient follow-up in 3 weeks with repeat CT stone protocol to make sure stone no longer present.

## 2024-01-16 ENCOUNTER — OFFICE VISIT (OUTPATIENT)
Dept: UROLOGY | Facility: CLINIC | Age: 54
End: 2024-01-16
Payer: COMMERCIAL

## 2024-01-16 ENCOUNTER — HOSPITAL ENCOUNTER (OUTPATIENT)
Dept: GENERAL RADIOLOGY | Facility: HOSPITAL | Age: 54
Discharge: HOME | End: 2024-01-16
Payer: COMMERCIAL

## 2024-01-16 VITALS — BODY MASS INDEX: 29.08 KG/M2 | HEIGHT: 62 IN | TEMPERATURE: 97.5 F | WEIGHT: 158 LBS

## 2024-01-16 DIAGNOSIS — N20.0 KIDNEY STONE: ICD-10-CM

## 2024-01-16 DIAGNOSIS — N20.0 KIDNEY STONE: Primary | ICD-10-CM

## 2024-01-16 LAB
BILIRUB BLD-MCNC: NEGATIVE MG/DL
CLARITY, POC: CLEAR
COLOR UR: YELLOW
GLUCOSE UR STRIP-MCNC: NEGATIVE MG/DL
KETONES UR QL: NEGATIVE
LEUKOCYTE EST, POC: NEGATIVE
NITRITE UR-MCNC: NEGATIVE MG/ML
PH UR: 6.5 [PH] (ref 5–8)
PROT UR STRIP-MCNC: NEGATIVE MG/DL
RBC # UR STRIP: ABNORMAL /UL
SP GR UR: 1.03 (ref 1–1.03)
UROBILINOGEN UR QL: ABNORMAL

## 2024-01-16 PROCEDURE — 74018 RADEX ABDOMEN 1 VIEW: CPT

## 2024-01-16 RX ORDER — TAMSULOSIN HYDROCHLORIDE 0.4 MG/1
0.4 CAPSULE ORAL DAILY
COMMUNITY
Start: 2024-01-03 | End: 2025-01-03

## 2024-01-24 ENCOUNTER — TELEPHONE (OUTPATIENT)
Dept: UROLOGY | Facility: CLINIC | Age: 54
End: 2024-01-24
Payer: COMMERCIAL

## 2024-01-24 NOTE — TELEPHONE ENCOUNTER
Caller: Von Jarrell    Relationship: Self    Best call back number: [phone]    What orders are you requesting (i.e. lab or imaging): CT SCAN    In what timeframe would the patient need to come in: ASAP    Where will you receive your lab/imaging services: [facility]    Additional notes: PT CALLED ASKING IF THE OFFICE COULD FAX THE ORDER FOR A CT SCAN -[phone]

## 2024-01-25 NOTE — TELEPHONE ENCOUNTER
Faxed order and prior authorization to [facility] for patient's appointment on 1/26/24.  Called patient to let her know it's been faxed, and cancelled her appointment with Radha Singh for 1/29/24.

## 2024-01-25 NOTE — TELEPHONE ENCOUNTER
PT CALLED FOR UPDATE ON STATUS OF ORDERS BEING FAXED TO [facility].  SHE WOULD LIKE THE ORDERS SENT TODAY, SO THAT SHE CAN COMPLETE THE SCAN 1/26/24.    PLEASE CALL PT TO CONFIRM THIS HAS BEEN COMPLETED.

## 2024-01-29 DIAGNOSIS — N20.0 KIDNEY STONE: ICD-10-CM

## 2024-02-21 NOTE — PROGRESS NOTES
"Subjective    Ms. Jarrell is 54 y.o. female    Chief Complaint: Left ureteral stone follow-up    History of Present Illness    54-year-old female established patient in for 3-week follow-up with repeat CT imaging after patient was found to have a 5 mm left distal ureteral obstructing stone seen at [facility] after patient presented with severe left sided flank pain and blood in urine.  Patient reporting overall feels much better still occasionally experiencing \"twinges of left sided pain\".  Denies any further gross hematuria.    The following portions of the patient's history were reviewed and updated as appropriate: allergies, current medications, past family history, past medical history, past social history, past surgical history and problem list.    Review of Systems   Constitutional:  Negative for chills and fever.   Gastrointestinal:  Negative for abdominal pain, anal bleeding and blood in stool.   Genitourinary:  Positive for flank pain. Negative for dysuria and hematuria.   Musculoskeletal:  Positive for back pain.         Current Outpatient Medications:     albuterol (PROVENTIL) (2.5 MG/3ML) 0.083% nebulizer solution, Take 2.5 mg by nebulization Every 4 (Four) Hours As Needed for Wheezing or Shortness of Air., Disp: 360 mL, Rfl: 11    albuterol sulfate  (90 Base) MCG/ACT inhaler, Inhale 2 puffs Every 4 (Four) Hours As Needed for Wheezing. ANY ALBUTEROL INHALER IS FINE, ANY THAT HER INSURANCE COVERS. THANKS., Disp: 18 g, Rfl: 11    budesonide-formoterol (Symbicort) 80-4.5 MCG/ACT inhaler, Inhale 2 puffs 2 (Two) Times a Day., Disp: 10.2 g, Rfl: 12    cholecalciferol (VITAMIN D3) 25 MCG (1000 UT) tablet, Take 1 tablet by mouth Daily., Disp: , Rfl:     montelukast (SINGULAIR) 10 MG tablet, Take 1 tablet by mouth Every Night., Disp: 30 tablet, Rfl: 11    tamsulosin (FLOMAX) 0.4 MG capsule 24 hr capsule, Take 1 capsule by mouth Daily., Disp: , Rfl:     Zinc Sulfate (ZINC-220 PO), Take  by " "mouth., Disp: , Rfl:     Ascorbic Acid (VITAMIN C PO), Take  by mouth. (Patient not taking: Reported on 2024), Disp: , Rfl:     History reviewed. No pertinent past medical history.    Past Surgical History:   Procedure Laterality Date     SECTION      x 2        Social History     Socioeconomic History    Marital status:    Tobacco Use    Smoking status: Never     Passive exposure: Never    Smokeless tobacco: Never   Vaping Use    Vaping Use: Never used   Substance and Sexual Activity    Alcohol use: Never    Drug use: Never    Sexual activity: Defer       Family History   Problem Relation Age of Onset    Heart attack Mother     Stroke Father     Diabetes Brother     Cancer Daughter        Objective    Temp 97.6 °F (36.4 °C)   Ht 157.5 cm (62.01\")   Wt 71.7 kg (158 lb)   BMI 28.89 kg/m²     Physical Exam  Nursing note reviewed.   Constitutional:       General: She is not in acute distress.     Appearance: Normal appearance. She is not ill-appearing.   HENT:      Nose: No congestion.   Abdominal:      Tenderness: There is no right CVA tenderness or left CVA tenderness.      Hernia: No hernia is present.   Skin:     General: Skin is warm and dry.   Neurological:      Mental Status: She is alert and oriented to person, place, and time.   Psychiatric:         Mood and Affect: Mood normal.         Behavior: Behavior normal.             Results for orders placed or performed in visit on 24   POC Urinalysis Dipstick, Multipro    Specimen: Urine   Result Value Ref Range    Color Yellow Yellow, Straw, Dark Yellow, Munira    Clarity, UA Clear Clear    Glucose, UA Negative Negative mg/dL    Bilirubin Negative Negative    Ketones, UA Negative Negative    Specific Gravity  1.025 1.005 - 1.030    Blood, UA Negative Negative    pH, Urine 6.0 5.0 - 8.0    Protein, POC Negative Negative mg/dL    Urobilinogen, UA 0.2 E.U./dL Normal, 0.2 E.U./dL    Nitrite, UA Negative Negative    Leukocytes Negative " Negative   Independent review of a CT scan of abdomen and pelvis without contrast  The CT scan of the abdomen/pelvis done without contrast is available for me to review.  Treatment recommendations require an independent review.  First I scanned the liver, spleen, and bowel pattern.  The retroperitoneum including the major vessels and lymphatic packages are briefly reviewed.  This film has been reviewed by the radiologist to determine any nonurologic abnormalities that are present.  The kidneys are closely inspected for size, symmetry, contour, parenchymal thickness, perinephric reaction, presence of calcifications, and intrarenal dilation of the collecting system.  The ureters are inspected for their course, caliber, and any calcifications.  The bladder is inspected for its thickness, size, and presence of any calcifications.  This scan shows no hydronephrosis.  Left ureteral stone no longer visualized.  No further renal stones seen.  Assessment and Plan    Diagnoses and all orders for this visit:    1. Kidney stone [N20.0] (Primary)  -     XR Abdomen KUB; Future      Repeat CT showing left-sided ureteral stone no longer visualized.  No further renal stones seen.    It appears patient has passed the ureteral stone.  Recommend follow-up in 1 year with KUB prior.  Have encouraged increasing fluid intake, lemonade therapy.

## 2024-02-28 ENCOUNTER — OFFICE VISIT (OUTPATIENT)
Dept: UROLOGY | Facility: CLINIC | Age: 54
End: 2024-02-28
Payer: COMMERCIAL

## 2024-02-28 VITALS — TEMPERATURE: 97.6 F | BODY MASS INDEX: 29.08 KG/M2 | WEIGHT: 158 LBS | HEIGHT: 62 IN

## 2024-02-28 DIAGNOSIS — N20.0 KIDNEY STONE: Primary | ICD-10-CM

## 2025-02-18 NOTE — PROGRESS NOTES
Subjective    Ms. Jarrell is 55 y.o. female    Chief Complaint: Left lower quadrant/flank pain    History of Present Illness    55-year-old female established patient in with new complaint of left lower quadrant/flank pain that started over the last 1 to 2 weeks intermittently.  Patient reports pain is more of a nagging sensation nothing severe.  Wanted to rule out a kidney stone due to patient's previous history of stones for which was last found to have a 5 mm left distal ureteral stone found at [facility] with spontaneous passage 2024.      The following portions of the patient's history were reviewed and updated as appropriate: allergies, current medications, past family history, past medical history, past social history, past surgical history and problem list.    Review of Systems   Constitutional:  Negative for chills and fever.   Gastrointestinal:  Negative for abdominal pain, anal bleeding and blood in stool.   Genitourinary:  Positive for flank pain (left). Negative for dysuria and hematuria.         Current Outpatient Medications:     albuterol (PROVENTIL) (2.5 MG/3ML) 0.083% nebulizer solution, Take 2.5 mg by nebulization Every 4 (Four) Hours As Needed for Wheezing or Shortness of Air., Disp: 360 mL, Rfl: 11    albuterol sulfate  (90 Base) MCG/ACT inhaler, Inhale 2 puffs Every 4 (Four) Hours As Needed for Wheezing. ANY ALBUTEROL INHALER IS FINE, ANY THAT HER INSURANCE COVERS. THANKS., Disp: 18 g, Rfl: 11    Ascorbic Acid (VITAMIN C PO), Take  by mouth., Disp: , Rfl:     budesonide-formoterol (Symbicort) 80-4.5 MCG/ACT inhaler, Inhale 2 puffs 2 (Two) Times a Day., Disp: 10.2 g, Rfl: 12    cholecalciferol (VITAMIN D3) 25 MCG (1000 UT) tablet, Take 1 tablet by mouth Daily., Disp: , Rfl:     montelukast (SINGULAIR) 10 MG tablet, Take 1 tablet by mouth Every Night., Disp: 30 tablet, Rfl: 11    tamsulosin (FLOMAX) 0.4 MG capsule 24 hr capsule, Take 1 capsule by mouth Every Night for 360  "days., Disp: 30 capsule, Rfl: 0    Zinc Sulfate (ZINC-220 PO), Take  by mouth., Disp: , Rfl:     History reviewed. No pertinent past medical history.    Past Surgical History:   Procedure Laterality Date     SECTION      x 2        Social History     Socioeconomic History    Marital status:    Tobacco Use    Smoking status: Never     Passive exposure: Never    Smokeless tobacco: Never   Vaping Use    Vaping status: Never Used   Substance and Sexual Activity    Alcohol use: Never    Drug use: Never    Sexual activity: Defer       Family History   Problem Relation Age of Onset    Heart attack Mother     Stroke Father     Diabetes Brother     Cancer Daughter        Objective    Ht 157.5 cm (62.01\")   Wt 71.7 kg (158 lb)   BMI 28.89 kg/m²     Physical Exam  Nursing note reviewed.   Constitutional:       General: She is not in acute distress.     Appearance: Normal appearance. She is not ill-appearing.   HENT:      Nose: No congestion.   Abdominal:      Tenderness: There is no right CVA tenderness or left CVA tenderness.      Hernia: No hernia is present.   Skin:     General: Skin is warm and dry.   Neurological:      Mental Status: She is alert and oriented to person, place, and time.   Psychiatric:         Mood and Affect: Mood normal.         Behavior: Behavior normal.             Results for orders placed or performed in visit on 25   POC Urinalysis Dipstick, Multipro    Collection Time: 25  8:55 AM    Specimen: Urine   Result Value Ref Range    Color Yellow Yellow, Straw, Dark Yellow, Munira    Clarity, UA Clear Clear    Glucose, UA Negative Negative mg/dL    Bilirubin Negative Negative    Ketones, UA Negative Negative    Specific Gravity  1.020 1.005 - 1.030    Blood, UA Negative Negative    pH, Urine 7.0 5.0 - 8.0    Protein, POC Trace (A) Negative mg/dL    Urobilinogen, UA 0.2 E.U./dL Normal, 0.2 E.U./dL    Nitrite, UA Negative Negative    Leukocytes Small (1+) (A) Negative   KUB " independent review    A KUB is available for me to review today.  The image is inspected for a bowel gas pattern and the general bone structure of the spine and pelvis. The kidneys are then inspected closely.  Renal outline is noted if identifiable. The kidney, collecting system, and anticipated path of the ureter are examined for calcifications including those in the true pelvis.  This film reveals:    On the right there are no calcificaitons seen in the kidney or the expected course of the ureter. .    On the left there is a single questionable distal ureteral stone measuring 3-4 mm.    Assessment and Plan    Diagnoses and all orders for this visit:    1. Kidney stone (Primary)  -     POC Urinalysis Dipstick, Multipro  -     Discontinue: tamsulosin (FLOMAX) 0.4 MG capsule 24 hr capsule; Take 1 capsule by mouth Every Night for 360 days.  Dispense: 30 capsule; Refill: 0  -     XR Abdomen KUB; Future  -     tamsulosin (FLOMAX) 0.4 MG capsule 24 hr capsule; Take 1 capsule by mouth Every Night for 360 days.  Dispense: 30 capsule; Refill: 0      KUB completed today prior to visit through [facility] revealing a questionable area that could represent a left distal ureteral stone versus pelvic phlebolith measuring between 3 and 4 mm.  Based on this finding patient would like to try expulsive therapy treat as if this were a stone.  I will send in tamsulosin 0.4 mg daily and have encouraged patient to really increase fluid intake over the next 3 weeks.  Will have return with repeat KUB

## 2025-02-27 ENCOUNTER — TELEPHONE (OUTPATIENT)
Dept: UROLOGY | Facility: CLINIC | Age: 55
End: 2025-02-27
Payer: COMMERCIAL

## 2025-02-27 NOTE — TELEPHONE ENCOUNTER
Pt called back, she is getting KUB done at [facility]. I faxed order and she is aware to bring disc of imaging.

## 2025-02-28 ENCOUNTER — OFFICE VISIT (OUTPATIENT)
Dept: UROLOGY | Facility: CLINIC | Age: 55
End: 2025-02-28
Payer: COMMERCIAL

## 2025-02-28 VITALS — WEIGHT: 158 LBS | BODY MASS INDEX: 29.08 KG/M2 | HEIGHT: 62 IN

## 2025-02-28 DIAGNOSIS — N20.0 KIDNEY STONE: Primary | ICD-10-CM

## 2025-02-28 LAB
BILIRUB BLD-MCNC: NEGATIVE MG/DL
CLARITY, POC: CLEAR
COLOR UR: YELLOW
GLUCOSE UR STRIP-MCNC: NEGATIVE MG/DL
KETONES UR QL: NEGATIVE
LEUKOCYTE EST, POC: ABNORMAL
NITRITE UR-MCNC: NEGATIVE MG/ML
PH UR: 7 [PH] (ref 5–8)
PROT UR STRIP-MCNC: ABNORMAL MG/DL
RBC # UR STRIP: NEGATIVE /UL
SP GR UR: 1.02 (ref 1–1.03)
UROBILINOGEN UR QL: ABNORMAL

## 2025-02-28 RX ORDER — TAMSULOSIN HYDROCHLORIDE 0.4 MG/1
1 CAPSULE ORAL NIGHTLY
Qty: 30 CAPSULE | Refills: 0 | Status: SHIPPED | OUTPATIENT
Start: 2025-02-28 | End: 2025-02-28

## 2025-02-28 RX ORDER — TAMSULOSIN HYDROCHLORIDE 0.4 MG/1
1 CAPSULE ORAL NIGHTLY
Qty: 30 CAPSULE | Refills: 0 | Status: SHIPPED | OUTPATIENT
Start: 2025-02-28 | End: 2026-02-23

## 2025-03-03 DIAGNOSIS — N20.0 KIDNEY STONE: ICD-10-CM
